# Patient Record
Sex: MALE | Race: WHITE | NOT HISPANIC OR LATINO | Employment: FULL TIME | URBAN - METROPOLITAN AREA
[De-identification: names, ages, dates, MRNs, and addresses within clinical notes are randomized per-mention and may not be internally consistent; named-entity substitution may affect disease eponyms.]

---

## 2017-06-04 ENCOUNTER — MYC MEDICAL ADVICE (OUTPATIENT)
Dept: FAMILY MEDICINE | Facility: CLINIC | Age: 57
End: 2017-06-04

## 2017-06-05 ENCOUNTER — TRANSFERRED RECORDS (OUTPATIENT)
Dept: HEALTH INFORMATION MANAGEMENT | Facility: CLINIC | Age: 57
End: 2017-06-05

## 2017-06-05 ENCOUNTER — ALLIED HEALTH/NURSE VISIT (OUTPATIENT)
Dept: FAMILY MEDICINE | Facility: CLINIC | Age: 57
End: 2017-06-05
Payer: COMMERCIAL

## 2017-06-05 ENCOUNTER — OFFICE VISIT (OUTPATIENT)
Dept: FAMILY MEDICINE | Facility: CLINIC | Age: 57
End: 2017-06-05
Payer: COMMERCIAL

## 2017-06-05 VITALS
WEIGHT: 264.6 LBS | BODY MASS INDEX: 37.04 KG/M2 | TEMPERATURE: 98.7 F | SYSTOLIC BLOOD PRESSURE: 166 MMHG | RESPIRATION RATE: 16 BRPM | HEART RATE: 64 BPM | HEIGHT: 71 IN | DIASTOLIC BLOOD PRESSURE: 102 MMHG

## 2017-06-05 VITALS — DIASTOLIC BLOOD PRESSURE: 102 MMHG | HEART RATE: 64 BPM | SYSTOLIC BLOOD PRESSURE: 166 MMHG

## 2017-06-05 DIAGNOSIS — E78.5 HYPERLIPIDEMIA LDL GOAL <130: ICD-10-CM

## 2017-06-05 DIAGNOSIS — R73.01 IMPAIRED FASTING GLUCOSE: ICD-10-CM

## 2017-06-05 DIAGNOSIS — I10 HYPERTENSION GOAL BP (BLOOD PRESSURE) < 140/90: ICD-10-CM

## 2017-06-05 DIAGNOSIS — G56.03 BILATERAL CARPAL TUNNEL SYNDROME: ICD-10-CM

## 2017-06-05 DIAGNOSIS — I10 HYPERTENSION GOAL BP (BLOOD PRESSURE) < 140/90: Primary | ICD-10-CM

## 2017-06-05 DIAGNOSIS — E78.5 HYPERLIPIDEMIA LDL GOAL <130: Primary | ICD-10-CM

## 2017-06-05 LAB
ALT SERPL W P-5'-P-CCNC: 42 U/L (ref 0–70)
ANION GAP SERPL CALCULATED.3IONS-SCNC: 11 MMOL/L (ref 3–14)
AST SERPL W P-5'-P-CCNC: 24 U/L (ref 0–45)
BUN SERPL-MCNC: 16 MG/DL (ref 7–30)
CALCIUM SERPL-MCNC: 9.3 MG/DL (ref 8.5–10.1)
CHLORIDE SERPL-SCNC: 104 MMOL/L (ref 94–109)
CHOLEST SERPL-MCNC: 289 MG/DL
CO2 SERPL-SCNC: 26 MMOL/L (ref 20–32)
CREAT SERPL-MCNC: 0.92 MG/DL (ref 0.66–1.25)
GFR SERPL CREATININE-BSD FRML MDRD: 85 ML/MIN/1.7M2
GLUCOSE SERPL-MCNC: 120 MG/DL (ref 70–99)
HDLC SERPL-MCNC: 78 MG/DL
LDLC SERPL CALC-MCNC: 173 MG/DL
NONHDLC SERPL-MCNC: 211 MG/DL
POTASSIUM SERPL-SCNC: 4.1 MMOL/L (ref 3.4–5.3)
SODIUM SERPL-SCNC: 141 MMOL/L (ref 133–144)
TRIGL SERPL-MCNC: 188 MG/DL

## 2017-06-05 PROCEDURE — 80061 LIPID PANEL: CPT | Performed by: FAMILY MEDICINE

## 2017-06-05 PROCEDURE — 84460 ALANINE AMINO (ALT) (SGPT): CPT | Performed by: PHYSICIAN ASSISTANT

## 2017-06-05 PROCEDURE — 36415 COLL VENOUS BLD VENIPUNCTURE: CPT | Performed by: FAMILY MEDICINE

## 2017-06-05 PROCEDURE — 84450 TRANSFERASE (AST) (SGOT): CPT | Performed by: PHYSICIAN ASSISTANT

## 2017-06-05 PROCEDURE — 99207 ZZC NO CHARGE NURSE ONLY: CPT

## 2017-06-05 PROCEDURE — 99214 OFFICE O/P EST MOD 30 MIN: CPT | Performed by: PHYSICIAN ASSISTANT

## 2017-06-05 PROCEDURE — 80048 BASIC METABOLIC PNL TOTAL CA: CPT | Performed by: FAMILY MEDICINE

## 2017-06-05 RX ORDER — SIMVASTATIN 20 MG
20 TABLET ORAL AT BEDTIME
Qty: 90 TABLET | Refills: 3 | Status: SHIPPED | OUTPATIENT
Start: 2017-06-05 | End: 2018-06-26

## 2017-06-05 RX ORDER — LISINOPRIL AND HYDROCHLOROTHIAZIDE 12.5; 2 MG/1; MG/1
1 TABLET ORAL DAILY
Qty: 30 TABLET | Refills: 1 | Status: SHIPPED | OUTPATIENT
Start: 2017-06-05 | End: 2017-07-28

## 2017-06-05 ASSESSMENT — PAIN SCALES - GENERAL: PAINLEVEL: NO PAIN (0)

## 2017-06-05 NOTE — NURSING NOTE
"Chief Complaint   Patient presents with     Hypertension       Initial BP (!) 166/102  Pulse 64  Temp 98.7  F (37.1  C) (Temporal)  Ht 5' 10.87\" (1.8 m)  Wt 264 lb 9.6 oz (120 kg)  BMI 37.04 kg/m2 Estimated body mass index is 37.04 kg/(m^2) as calculated from the following:    Height as of this encounter: 5' 10.87\" (1.8 m).    Weight as of this encounter: 264 lb 9.6 oz (120 kg).  Medication Reconciliation: complete     Neeta Rodriguez CMA  "

## 2017-06-05 NOTE — MR AVS SNAPSHOT
After Visit Summary   6/5/2017    Henrry Cortes    MRN: [id number]           Patient Information     Date Of Birth          1960        Visit Information        Provider Department      6/5/2017 10:40 AM Suzie Dodson PA-C [facility]        Today's Diagnoses     Hypertension goal BP (blood pressure) < 140/90    -  1    Hyperlipidemia LDL goal <130        Impaired fasting glucose        Bilateral carpal tunnel syndrome          Care Instructions      Carpal Tunnel Syndrome    Carpal tunnel syndrome is a painful condition of the wrist and arm. It is caused by pressure on the median nerve.  The median nerve is one of the nerves that give feeling and movement to the hand. It passes through a tunnel in the wrist called the carpal tunnel. This tunnel is made up of bones and ligaments. Narrowing of this tunnel or swelling of the tissues inside the tunnel puts pressure on the median nerve. This causes numbness, pins and needles, or electric shooting pains in your hand and forearm. Often the pain is worse at night and may wake you when you are asleep.  Carpal tunnel syndrome may occur during pregnancy and with use of birth control pills. It is more common in workers who must often bend their wrists. It is also common in people who work with power tools that cause strong vibrations.  Home care    Rest the painful wrist. Avoid repeated bending of the wrist back and forth. This puts pressure on the median nerve. Avoid using power tools with strong vibrations.    If you were given a splint, wear it at night while you sleep. You may also wear it during the day for comfort.    Move your fingers and wrists often to avoid stiffness.    Elevate your arms on pillows when you lie down.    Try using the unaffected hand more.    Try not to hold your wrists in a bent, downward position.    Sometimes changes in the work place may ease symptoms. If you type most of the day, it may help to change the  position of your keyboard or add a wrist support. Your wrist should be in a neutral position and not bent back when typing.    You may use over-the-counter pain medicine to treat pain and inflammation, unless another medicine was prescribed. Anti-inflammatory pain medicines, such as ibuprofen or naproxen may be more effective than acetaminophen, which treats pain, but not inflammation. If you have chronic liver or kidney disease or ever had a stomach ulcer or GI bleeding, talk with your doctor before using these medicines.    Opioid pain medicine will only give temporary relief and does not treat the problem. If pain continues, you may need a shot of a steroid drug into your wrist.    If the above methods fail, you may need surgery. This will open the carpal tunnel and release the pressure on the trapped nerve.  Follow-up care  Follow up with your healthcare provider, or as advised, if the pain doesn t begin to improve within the next week.  If X-rays were taken, you will be notified of any new findings that may affect your care.  When to seek medical advice  Call your healthcare provider right away if any of these occur:    Pain not improving with the above treatment    Fingers or hand become cold, blue, numb, or tingly    Your whole arm becomes swollen or weak    [id number] [employer]. [address] [place] [address], [place] [address]. All rights reserved. This information is not intended as a substitute for professional medical care. Always follow your healthcare professional's instructions.        Discharge Instructions for High Blood Pressure (Hypertension)  You have been diagnosed with high blood pressure (also called hypertension). This means the force of blood against your artery walls is too strong. It also means your heart is working hard to move blood. High blood pressure usually has no symptoms, but over time, it can damage your heart, blood vessels, eyes, kidneys, and other organs. With help from  your doctor, you can manage your blood pressure and protect your health.  Taking medications    Learn to take your own blood pressure. Keep a record of your results. Ask your doctor which readings mean that you need medical attention.    Take your blood pressure medication exactly as directed. Don t skip doses. Missing doses can cause your blood pressure to get out of control.    Avoid medications that contain heart stimulants, including over-the-counter drugs. Check for warnings about high blood pressure on the label.    Check with your doctor before taking a decongestant. Some decongestants can worsen high blood pressure.  Lifestyle changes    Maintain a healthy weight. Get help to lose any extra pounds.    Cut back on salt.    Limit canned, dried, packaged, and fast foods.    Don t add salt to your food at the table.    Season foods with herbs instead of salt when you cook.    Follow the DASH (Dietary Approaches to Stop Hypertension) eating plan. This plan recommends vegetables, fruits, whole gains, and other heart healthy foods.    Begin an exercise program. Ask your doctor how to get started. The American Heart Association recommends aerobic exercise 3 to 4 times a week for an average of 40 minutes at a time, with your doctor's approval. Simple activities like walking or gardening can help.    Break the smoking habit. Enroll in a stop-smoking program to improve your chances of success. Ask your health care provider about programs and medications to help you stop smoking.    Limit drinks that contain caffeine (coffee, black or green tea, cola) to 2 per day.    Never take stimulants such as amphetamines or cocaine; these drugs can be deadly for someone with high blood pressure.    Control your stress. Learn stress-management techniques.    Limit alcohol to no more than 1 drink a day for women and 2 drinks a day for men.  Follow-up care  Make a follow-up appointment as directed by our staff.     When to seek  medical care  Call your doctor immediately if you have any of the following:    Chest pain or shortness of breath (call 911)    Moderate to severe headache    Weakness in the muscles of your face, arms, or legs    Trouble speaking    Extreme drowsiness    Confusion    Fainting or dizziness    Pulsating or rushing sound in your ears    Unexplained nosebleed    Weakness, tingling, or numbness of your face, arms, or legs    Change in vision    Blood pressure measured at home that is greater than 180/110     8801-0203 [employer]. [address] [place] [address]. All rights reserved. This information is not intended as a substitute for professional medical care. Always follow your healthcare professional's instructions.                Follow-ups after your visit        Your next 10 appointments already scheduled     Jun 05, 2017 10:40 AM CDT   (Arrive by 10:20 AM)   Office Visit with Suzie Dodson PA-C   [facility] ([facility])    [address] [place] [address], [address]  [facility] [id number] [phone]           Bring a current list of meds and any records pertaining to this visit.  For Physicals, please bring immunization records and any forms needing to be filled out.  Please arrive 10 minutes early to complete paperwork.            Jun 09, 2017  4:20 PM CDT   MyChart Physical Adult with Annie Wall MD   [facility] [place] ([facility])    [address] [place] [address], [address]  [facility] [id number] [phone]              Who to contact     If you have questions or need follow up information about today's clinic visit or your schedule please contact [facility] directly at [phone].  Normal or non-critical lab and imaging results will be communicated to you by MyChart, letter or phone within 4 business days after the clinic has received the results. If you do not hear from us within 7 days, please contact the clinic through  "[employer] or phone. If you have a critical or abnormal lab result, we will notify you by phone as soon as possible.  Submit refill requests through [employer] or call your pharmacy and they will forward the refill request to us. Please allow 3 business days for your refill to be completed.          Additional Information About Your Visit        [employer] Information     [employer] gives you secure access to your electronic health record. If you see a primary care provider, you can also send messages to your care team and make appointments. If you have questions, please call your primary care clinic.  If you do not have a primary care provider, please call [phone] and they will assist you.        Care EveryWhere ID     This is your Care EveryWhere ID. This could be used by other organizations to access your [facility] medical records  [id number]        Your Vitals Were     Pulse Temperature Respirations Height BMI (Body Mass Index)       64 98.7  F (37.1  C) (Temporal) 16 5' 10.87\" (1.8 m) 37.04 kg/m2        Blood Pressure from Last 3 Encounters:   06/05/17 (!) 166/102   06/05/17 (!) 166/102   06/01/16 106/72    Weight from Last 3 Encounters:   06/05/17 264 lb 9.6 oz (120 kg)   06/01/16 234 lb 9.6 oz (106.4 kg)   05/10/16 230 lb 12.8 oz (104.7 kg)              We Performed the Following     ALT     AST     Hemoglobin A1c          Today's Medication Changes          These changes are accurate as of: 6/5/17  9:27 AM.  If you have any questions, ask your nurse or doctor.               Start taking these medicines.        Dose/Directions    lisinopril-hydrochlorothiazide 20-12.5 MG per tablet   Commonly known as:  PRINZIDE/ZESTORETIC   Used for:  Hypertension goal BP (blood pressure) < 140/90   Started by:  Suzie Dodson PA-C        Dose:  1 tablet   Take 1 tablet by mouth daily   Quantity:  30 tablet   Refills:  1       simvastatin 20 MG tablet   Commonly known as:  ZOCOR   Used for:  Hyperlipidemia LDL goal <130 "   Started by:  Suzie Dodson PA-C        Dose:  20 mg   Take 1 tablet (20 mg) by mouth At Bedtime   Quantity:  90 tablet   Refills:  3            Where to get your medicines      These medications were sent to [id number] - [place] - [address]  [address], [place] [address]    Hours:  test fax sent successfully 1/20/04   Phone:  [phone]     lisinopril-hydrochlorothiazide 20-12.5 MG per tablet    simvastatin 20 MG tablet                Primary Care Provider Office Phone # Fax #    Annie Wall [phone]       [facility] [address] [facility]  [place] [address]        Thank you!     Thank you for choosing [facility]  for your care. Our goal is always to provide you with excellent care. Hearing back from our patients is one way we can continue to improve our services. Please take a few minutes to complete the written survey that you may receive in the mail after your visit with us. Thank you!             Your Updated Medication List - Protect others around you: Learn how to safely use, store and throw away your medicines at www.disposemymeds.org.          This list is accurate as of: 6/5/17  9:27 AM.  Always use your most recent med list.                   Brand Name Dispense Instructions for use    ADVIL PO      Take 400 mg by mouth as needed       CIALIS 10 MG tablet   Generic drug:  tadalafil     15    ONE TABLET, TAKEN AT LEAST 30 MINUTES BEFORE INTERCOURSE       lisinopril-hydrochlorothiazide 20-12.5 MG per tablet    PRINZIDE/ZESTORETIC    30 tablet    Take 1 tablet by mouth daily       simvastatin 20 MG tablet    ZOCOR    90 tablet    Take 1 tablet (20 mg) by mouth At Bedtime

## 2017-06-05 NOTE — PROGRESS NOTES
Henrry Cortes is a 56 year old male who comes in today for a Blood Pressure check because of ongoing blood pressure monitoring.    *Document pulse and BP  *Use new set of vitals button for multiple readings.  *Use extended vitals for orthostatic    Vitals as recorded, a large cuff was used.    Patient is not taking medication as prescribed  Patient is monitoring Blood Pressure at home.  Average readings if yes are 174/93    Current complaints: none    Disposition: results routed to MD/AP    . Pt was added to Suzie Dodson's schedule for today 6/5/17, for his blood pressure.     Neeta Rodriguez CMA

## 2017-06-05 NOTE — PROGRESS NOTES
SUBJECTIVE:                                                    Henrry Cortes is a 56 year old male who presents to clinic today for the following health issues:    Hyperlipidemia Follow-Up      Rate your low fat/cholesterol diet?: not monitoring fat    Taking statin?  No    Other lipid medications/supplements?:  none     Hypertension Follow-up      Outpatient blood pressures are being checked at home.  Results are 176/103.    Low Salt Diet: low salt       Amount of exercise or physical activity: Up until a couple months ago he was    Problems taking medications regularly: No    Medication side effects: none    Diet: regular (no restrictions)    Was taking Lisinopril and Simvastatin-- Stopped taking them about 1.5 years ago.   No side effects noticed.   Stopped taking them as he lost weight. No sx presently.   Was smoker- quit April 5, 2017 (2 mo ago). Has gained weight since stopping smoking- eating more, snacking.   Was 230 lb at time he quit, now 264 lb today.   Started rechecking BP with weight gain- knew his pressures were high, so came in today.   Denies CP, SOB, headache, vision changes, weakness, trouble speaking swallowing, hearing changes, balance problems, abd pain. Denies present numbness or tingling.     Has for month or more, numbness in finger tips bilaterally. Notices when sleeping or driving. Has to shake hands out to return feeling and then normal feeling returns. Right > Left. Right handed. Not long lasting.   Feels pressure at wrists.   No neck pain.   No weakness with gripping.  No pain in the hands   More gardening and yard work for hobby over past few months- gripping.  Wondering if the weight gain contributing also  .  Problem list and histories reviewed & adjusted, as indicated.  Additional history: as documented    Patient Active Problem List   Diagnosis     HYPERLIPIDEMIA LDL GOAL <130     Impaired fasting glucose     Snoring     Plantar fasciitis     Hypertension goal BP (blood pressure)  < 140/90     DELANEY (obstructive sleep apnea)-Mild (AHI 8)     Family history of bicuspid cardiac valve     Family history of disease of aorta     Past Surgical History:   Procedure Laterality Date     COLONOSCOPY  9/20/2013    Procedure: COLONOSCOPY;  Colonscopy/Screen for colon cancer  Rp- [facility]  PKT sent 8/14/13 sah;  Surgeon: Annie Wall MD;  Location: [facility] OR     HERNIA REPAIR, UMBILICAL  8/6/04    with mess     LASIK  2003,2004    had revisions x 2     REMOVAL OF SPERM DUCT(S)  2000       Social History   Substance Use Topics     Smoking status: Former Smoker     Types: Cigarettes     Quit date: 10/20/2010     Smokeless tobacco: Never Used     Alcohol use 0.5 oz/week     1 Standard drinks or equivalent per week      Comment: occasionally     Family History   Problem Relation Age of Onset     HEART DISEASE Father      composite valve/aotic graft and triple bypass age 69     Hypertension Father      CANCER Father      CEREBROVASCULAR DISEASE Father      Prostate Cancer Father      Cardiovascular Father      Lipids Father      Hypertension Mother      Alzheimer Disease Mother      dementia     DIABETES Paternal Grandmother      Genitourinary Problems Maternal Grandmother      kidney     CANCER Sister 47     breast     Breast Cancer Sister      Breast Cancer Sister      Asthma Son      mild     C.A.D. No family hx of      Cancer - colorectal No family hx of      Alcohol/Drug No family hx of      Allergies No family hx of      Anesthesia Reaction No family hx of      Arthritis No family hx of      Blood Disease No family hx of      Circulatory No family hx of      Congenital Anomalies No family hx of      Connective Tissue Disorder No family hx of      Depression No family hx of      Eye Disorder No family hx of      Genetic Disorder No family hx of      GASTROINTESTINAL DISEASE No family hx of      Gynecology No family hx of      Musculoskeletal Disorder No family hx of      Neurologic Disorder No family hx  "of      Obesity No family hx of      OSTEOPOROSIS No family hx of      Psychotic Disorder No family hx of      Respiratory No family hx of      Thyroid Disease No family hx of      Hearing Loss No family hx of          Current Outpatient Prescriptions   Medication Sig Dispense Refill     lisinopril-hydrochlorothiazide (PRINZIDE/ZESTORETIC) 20-12.5 MG per tablet Take 1 tablet by mouth daily 30 tablet 1     simvastatin (ZOCOR) 20 MG tablet Take 1 tablet (20 mg) by mouth At Bedtime 90 tablet 3     CIALIS 10 MG OR TABS ONE TABLET, TAKEN AT LEAST 30 MINUTES BEFORE INTERCOURSE 15 1 YEAR     Ibuprofen (ADVIL PO) Take 400 mg by mouth as needed       Allergies   Allergen Reactions     No Known Drug Allergies      BP Readings from Last 3 Encounters:   06/05/17 (!) 166/102   06/05/17 (!) 166/102   06/01/16 106/72    Wt Readings from Last 3 Encounters:   06/05/17 264 lb 9.6 oz (120 kg)   06/01/16 234 lb 9.6 oz (106.4 kg)   05/10/16 230 lb 12.8 oz (104.7 kg)                  Labs reviewed in EPIC    Reviewed and updated as needed this visit by clinical staff  Tobacco  Allergies  Med Hx  Surg Hx  Fam Hx  Soc Hx      Reviewed and updated as needed this visit by Provider         ROS:  Constitutional, HEENT, cardiovascular, pulmonary, gi and gu systems are negative, except as otherwise noted.    OBJECTIVE:                                                    BP (!) 166/102  Pulse 64  Temp 98.7  F (37.1  C) (Temporal)  Resp 16  Ht 5' 10.87\" (1.8 m)  Wt 264 lb 9.6 oz (120 kg)  BMI 37.04 kg/m2  Body mass index is 37.04 kg/(m^2).  GENERAL: healthy, alert and no distress  EYES: Eyes grossly normal to inspection, PERRL and conjunctivae and sclerae normal  HENT: ear canals and TM's normal, nose and mouth without ulcers or lesions  NECK: no adenopathy, no asymmetry, masses, or scars, thyroid normal to palpation and no carotid bruits  RESP: lungs clear to auscultation - no rales, rhonchi or wheezes  CV: regular rate and rhythm, " normal S1 S2, no S3 or S4, no murmur, click or rub, no peripheral edema and peripheral pulses strong  ABDOMEN: soft, nontender, no masses and bowel sounds normal  MSK: Hands: no swelling, no skin changes, no atrophy. Normal ROM,  5/5. Sensation intact, normal cap refill. +tinnels and + phalens bilaterally.  No LE edema. + LE varicosities, nontender  NEURO: Normal strength and tone, mentation intact and speech normal  PSYCH: mentation appears normal, affect normal/bright    Diagnostic Test Results:  No results found for this or any previous visit (from the past 24 hour(s)).     ASSESSMENT/PLAN:                                                      1. Hypertension goal BP (blood pressure) < 140/90  Stage 2 HTN, untreated with being off medication and weight gain.   Has had lisinopril in the past and tolerated. Add HCTZ, in combo tab as below.  Pt had labs today (BMP and lipids), and has annual exam scheduled for Friday this week with PCP- follow up at that visit.   Pt will monitor BP at home.   Discussed warning s/sx that would prompt ER visit with elevated BP, pt will restart medication today.   Congratulated on smoking cessation.   - lisinopril-hydrochlorothiazide (PRINZIDE/ZESTORETIC) 20-12.5 MG per tablet; Take 1 tablet by mouth daily  Dispense: 30 tablet; Refill: 1    2. Hyperlipidemia LDL goal <130  Pt states willing to restart, refill sent. Tolerated in the past.   - simvastatin (ZOCOR) 20 MG tablet; Take 1 tablet (20 mg) by mouth At Bedtime  Dispense: 90 tablet; Refill: 3  - ALT  - AST    3. Impaired fasting glucose  Hx of IFG. Wweight gain of 30lb over past year. Check a1c in addition to glucose.   - Hemoglobin A1c; Future    4. Bilateral carpal tunnel syndrome  Discussed s/sx, aggravating factors.   Thumb spica splint given bilaterally. Ice, limit activity that aggravates.  If sx persist, referral to ortho      Follow Up: For worsening symptoms (ie new fevers, worsening pain, etc), non-improvement as  expected/discussed, questions regarding your medications or treatment plan. Discussed parameters for follow up and included in After Visit Summary given to patient.      Suzie Dodson PA-C  [facility]

## 2017-06-05 NOTE — PATIENT INSTRUCTIONS
Carpal Tunnel Syndrome    Carpal tunnel syndrome is a painful condition of the wrist and arm. It is caused by pressure on the median nerve.  The median nerve is one of the nerves that give feeling and movement to the hand. It passes through a tunnel in the wrist called the carpal tunnel. This tunnel is made up of bones and ligaments. Narrowing of this tunnel or swelling of the tissues inside the tunnel puts pressure on the median nerve. This causes numbness, pins and needles, or electric shooting pains in your hand and forearm. Often the pain is worse at night and may wake you when you are asleep.  Carpal tunnel syndrome may occur during pregnancy and with use of birth control pills. It is more common in workers who must often bend their wrists. It is also common in people who work with power tools that cause strong vibrations.  Home care    Rest the painful wrist. Avoid repeated bending of the wrist back and forth. This puts pressure on the median nerve. Avoid using power tools with strong vibrations.    If you were given a splint, wear it at night while you sleep. You may also wear it during the day for comfort.    Move your fingers and wrists often to avoid stiffness.    Elevate your arms on pillows when you lie down.    Try using the unaffected hand more.    Try not to hold your wrists in a bent, downward position.    Sometimes changes in the work place may ease symptoms. If you type most of the day, it may help to change the position of your keyboard or add a wrist support. Your wrist should be in a neutral position and not bent back when typing.    You may use over-the-counter pain medicine to treat pain and inflammation, unless another medicine was prescribed. Anti-inflammatory pain medicines, such as ibuprofen or naproxen may be more effective than acetaminophen, which treats pain, but not inflammation. If you have chronic liver or kidney disease or ever had a stomach ulcer or GI bleeding, talk with your  doctor before using these medicines.    Opioid pain medicine will only give temporary relief and does not treat the problem. If pain continues, you may need a shot of a steroid drug into your wrist.    If the above methods fail, you may need surgery. This will open the carpal tunnel and release the pressure on the trapped nerve.  Follow-up care  Follow up with your healthcare provider, or as advised, if the pain doesn t begin to improve within the next week.  If X-rays were taken, you will be notified of any new findings that may affect your care.  When to seek medical advice  Call your healthcare provider right away if any of these occur:    Pain not improving with the above treatment    Fingers or hand become cold, blue, numb, or tingly    Your whole arm becomes swollen or weak    9859-3765 [employer]. [address] [place] [address], [place] [address]. All rights reserved. This information is not intended as a substitute for professional medical care. Always follow your healthcare professional's instructions.        Discharge Instructions for High Blood Pressure (Hypertension)  You have been diagnosed with high blood pressure (also called hypertension). This means the force of blood against your artery walls is too strong. It also means your heart is working hard to move blood. High blood pressure usually has no symptoms, but over time, it can damage your heart, blood vessels, eyes, kidneys, and other organs. With help from your doctor, you can manage your blood pressure and protect your health.  Taking medications    Learn to take your own blood pressure. Keep a record of your results. Ask your doctor which readings mean that you need medical attention.    Take your blood pressure medication exactly as directed. Don t skip doses. Missing doses can cause your blood pressure to get out of control.    Avoid medications that contain heart stimulants, including over-the-counter drugs. Check for warnings about  high blood pressure on the label.    Check with your doctor before taking a decongestant. Some decongestants can worsen high blood pressure.  Lifestyle changes    Maintain a healthy weight. Get help to lose any extra pounds.    Cut back on salt.    Limit canned, dried, packaged, and fast foods.    Don t add salt to your food at the table.    Season foods with herbs instead of salt when you cook.    Follow the DASH (Dietary Approaches to Stop Hypertension) eating plan. This plan recommends vegetables, fruits, whole gains, and other heart healthy foods.    Begin an exercise program. Ask your doctor how to get started. The American Heart Association recommends aerobic exercise 3 to 4 times a week for an average of 40 minutes at a time, with your doctor's approval. Simple activities like walking or gardening can help.    Break the smoking habit. Enroll in a stop-smoking program to improve your chances of success. Ask your health care provider about programs and medications to help you stop smoking.    Limit drinks that contain caffeine (coffee, black or green tea, cola) to 2 per day.    Never take stimulants such as amphetamines or cocaine; these drugs can be deadly for someone with high blood pressure.    Control your stress. Learn stress-management techniques.    Limit alcohol to no more than 1 drink a day for women and 2 drinks a day for men.  Follow-up care  Make a follow-up appointment as directed by our staff.     When to seek medical care  Call your doctor immediately if you have any of the following:    Chest pain or shortness of breath (call 911)    Moderate to severe headache    Weakness in the muscles of your face, arms, or legs    Trouble speaking    Extreme drowsiness    Confusion    Fainting or dizziness    Pulsating or rushing sound in your ears    Unexplained nosebleed    Weakness, tingling, or numbness of your face, arms, or legs    Change in vision    Blood pressure measured at home that is greater  than 180/110     4086-2239 [employer]. [address] [place] [address], [place] [address]. All rights reserved. This information is not intended as a substitute for professional medical care. Always follow your healthcare professional's instructions.

## 2017-06-05 NOTE — MR AVS SNAPSHOT
After Visit Summary   6/5/2017    Henrry Cortes    MRN: [id number]           Patient Information     Date Of Birth          1960        Visit Information        Provider Department      6/5/2017 9:00 AM RG [facility] [place] [facility]hav[facility]        Today's Diagnoses     Hyperlipidemia LDL goal <130    -  1    Hypertension goal BP (blood pressure) < 140/90           Follow-ups after your visit        Follow-up notes from your care team     Return for BP Recheck.      Your next 10 appointments already scheduled     Jun 05, 2017  9:00 AM CDT   Nurse Only with RG FLOAT 1   [facility] [facility]    [address]  [facility] [id number] [phone]            Jun 05, 2017 10:30 AM CDT   LAB with RG LAB   [facility] [place] [facility]    [address]  [facility] [id number] [phone]           Patient must bring picture ID.  Patient should be prepared to give a urine specimen  Please do not eat 10-12 hours before your appointment if you are coming in fasting for labs on lipids, cholesterol, or glucose (sugar).  Pregnant women should follow their Care Team instructions. Water with medications is okay. Do not drink coffee or other fluids.   If you have concerns about taking  your medications, please ask at office or if scheduling via [employer], send a message by clicking on Secure Messaging, Message Your Care Team.            Jun 09, 2017  4:20 PM CDT   MyChart Physical Adult with Annie Wall MD   [facility] [facility]    [address]  [facility] [id number] [phone]              Who to contact     If you have questions or need follow up information about today's clinic visit or your schedule please [facility] directly at [phone].  Normal or non-critical lab and imaging results will be communicated to you by MyChart, letter or phone  within 4 business days after the clinic has received the results. If you do not hear from us within 7 days, please contact the clinic through [employer] or phone. If you have a critical or abnormal lab result, we will notify you by phone as soon as possible.  Submit refill requests through [employer] or call your pharmacy and they will forward the refill request to us. Please allow 3 business days for your refill to be completed.          Additional Information About Your Visit        [email or web address] Information     [employer] gives you secure access to your electronic health record. If you see a primary care provider, you can also send messages to your care team and make appointments. If you have questions, please call your primary care clinic.  If you do not have a primary care provider, please call [phone] and they will assist you.        Care EveryWhere ID     This is your Care EveryWhere ID. This could be used by other organizations to access your [facility] [place] medical records  [id number]        Your Vitals Were     Pulse                   64            Blood Pressure from Last 3 Encounters:   06/05/17 (!) 166/102   06/01/16 106/72   05/10/16 122/70    Weight from Last 3 Encounters:   06/01/16 234 lb 9.6 oz (106.4 kg)   05/10/16 230 lb 12.8 oz (104.7 kg)   03/03/16 227 lb 1.6 oz (103 kg)              We Performed the Following     Basic metabolic panel  (Ca, Cl, CO2, Creat, Gluc, K, Na, BUN)     Lipid Profile (Chol, Trig, HDL, LDL calc)        Primary Care Provider Office Phone # Fax #    Annie Wall [phone]       [facility] [address] [facility] [address]        Thank you!     Thank you for choosing [facility]  for your care. Our goal is always to provide you with excellent care. Hearing back from our patients is one way we can continue to improve our services. Please take a few minutes to complete the written survey that you may receive in the mail after your visit with  us. Thank you!             Your Updated Medication List - Protect others around you: Learn how to safely use, store and throw away your medicines at www.disposemymeds.org.          This list is accurate as of: 6/5/17  8:53 AM.  Always use your most recent med list.                   Brand Name Dispense Instructions for use    ADVIL PO      Take 400 mg by mouth as needed       CIALIS 10 MG tablet   Generic drug:  tadalafil     15    ONE TABLET, TAKEN AT LEAST 30 MINUTES BEFORE INTERCOURSE

## 2017-06-06 NOTE — PATIENT INSTRUCTIONS
Recheck fasting labs in 4-6 weeks.     Preventive Health Recommendations  Male Ages 50   64    Yearly exam:             See your health care provider every year in order to  o   Review health changes.   o   Discuss preventive care.    o   Review your medicines if your doctor has prescribed any.     Have a cholesterol test every 5 years, or more frequently if you are at risk for high cholesterol/heart disease.     Have a diabetes test (fasting glucose) every three years. If you are at risk for diabetes, you should have this test more often.     Have a colonoscopy at age 50, or have a yearly FIT test (stool test). These exams will check for colon cancer.      Talk with your health care provider about whether or not a prostate cancer screening test (PSA) is right for you.    You should be tested each year for STDs (sexually transmitted diseases), if you re at risk.     Shots: Get a flu shot each year. Get a tetanus shot every 10 years.     Nutrition:    Eat at least 5 servings of fruits and vegetables daily.     Eat whole-grain bread, whole-wheat pasta and brown rice instead of white grains and rice.     Talk to your provider about Calcium and Vitamin D.     Lifestyle    Exercise for at least 150 minutes a week (30 minutes a day, 5 days a week). This will help you control your weight and prevent disease.     Limit alcohol to one drink per day.     No smoking.     Wear sunscreen to prevent skin cancer.     See your dentist every six months for an exam and cleaning.     See your eye doctor every 1 to 2 years.

## 2017-06-06 NOTE — PROGRESS NOTES
"  SUBJECTIVE:     CC: Henrry Cortes is an 56 year old male who presents for preventative health visit.     Healthy Habits:    Do you get at least three servings of calcium containing foods daily (dairy, green leafy vegetables, etc.)? {YES/NO, DAIRY INTAKE:721542::\"yes\"}    Amount of exercise or daily activities, outside of work: {AMOUNT EXERCISE:539484}    Problems taking medications regularly {Yes /No default:626555::\"No\"}    Medication side effects: {Yes /No default.:833589::\"No\"}    Have you had an eye exam in the past two years? {YESNOBLANK:579436}    Do you see a dentist twice per year? {YESNOBLANK:072600}    Do you have sleep apnea, excessive snoring or daytime drowsiness?{YESNOBLANK:598605}    {Outside tests to abstract? :370584}    {additional problems to add:041638}    Today's PHQ-2 Score:   PHQ-2 ( 1999 Pfizer) 6/5/2017 6/4/2017   Q1: Little interest or pleasure in doing things 0 0   Q2: Feeling down, depressed or hopeless 0 0   PHQ-2 Score 0 0   Q1: Little interest or pleasure in doing things - Not at all   Q2: Feeling down, depressed or hopeless - Not at all   PHQ-2 Score - 0     {PHQ-2 LOOK IN ASSESSMENTS :431750}  Abuse: Current or Past(Physical, Sexual or Emotional)- {YES/NO/NA:164778}  Do you feel safe in your environment - {YES/NO/NA:757862}    Social History   Substance Use Topics     Smoking status: Former Smoker     Types: Cigarettes     Quit date: 10/20/2010     Smokeless tobacco: Never Used     Alcohol use 0.5 oz/week     1 Standard drinks or equivalent per week      Comment: occasionally     {ETOH AUDIT:552943}    Last PSA:   PSA   Date Value Ref Range Status   03/03/2016 0.35 0 - 4 ug/L Final       Recent Labs   Lab Test  06/05/17   0842  05/10/16   0914   07/18/14   1034  07/11/13   0804   CHOL  289*  180   --   185  198   HDL  78  66   --   62  53   LDL  173*  99   < >  106  115   TRIG  188*  75   --   87  149   CHOLHDLRATIO   --    --    --   3.0  3.7   [facility]  211*  114   --    --    --     " "< > = values in this interval not displayed.       Reviewed orders with patient. Reviewed health maintenance and updated orders accordingly - {Yes/No:235091::\"Yes\"}    Reviewed and updated as needed this visit by clinical staff         Reviewed and updated as needed this visit by Provider        {HISTORY OPTIONS:017957}    ROS:  {MALE ROS-adult preventive care package:161262::\"C: NEGATIVE for fever, chills, change in weight\",\"I: NEGATIVE for worrisome rashes, moles or lesions\",\"E: NEGATIVE for vision changes or irritation\",\"ENT: NEGATIVE for ear, mouth and throat problems\",\"R: NEGATIVE for significant cough or SOB\",\"CV: NEGATIVE for chest pain, palpitations or peripheral edema\",\"GI: NEGATIVE for nausea, abdominal pain, heartburn, or change in bowel habits\",\" male: negative for dysuria, hematuria, decreased urinary stream, erectile dysfunction, urethral discharge\",\"M: NEGATIVE for significant arthralgias or myalgia\",\"N: NEGATIVE for weakness, dizziness or paresthesias\",\"P: NEGATIVE for changes in mood or affect\"}    {CHRONICPROBDATA:334953}  OBJECTIVE:     There were no vitals taken for this visit.  EXAM:  {Exam Choices:364335}    ASSESSMENT/PLAN:     {Diag Picklist:580940}    COUNSELING:  {MALE COUNSELING MESSAGES:298928::\"Reviewed preventive health counseling, as reflected in patient instructions\"}    {Blood Pressure - Adult Preventive:863685}     reports that he quit smoking about 6 years ago. His smoking use included Cigarettes. He has never used smokeless tobacco.  {Tobacco Cessation needed for ACO -- Delete if patient is a non-smoker:667697}  Estimated body mass index is 37.04 kg/(m^2) as calculated from the following:    Height as of 6/5/17: 5' 10.87\" (1.8 m).    Weight as of 6/5/17: 264 lb 9.6 oz (120 kg).   {Weight Management Plan needed for ACO:566972}    Counseling Resources:  ATP IV Guidelines  Pooled Cohorts Equation Calculator  FRAX Risk Assessment  ICSI Preventive Guidelines  Dietary Guidelines for " Americans, 2010  USDA's MyPlate  ASA Prophylaxis  Lung CA Screening    TRENT PERES MD, MD  [facility] [place]

## 2017-06-09 ENCOUNTER — OFFICE VISIT (OUTPATIENT)
Dept: FAMILY MEDICINE | Facility: CLINIC | Age: 57
End: 2017-06-09
Payer: COMMERCIAL

## 2017-06-09 VITALS
TEMPERATURE: 98.7 F | BODY MASS INDEX: 35.84 KG/M2 | RESPIRATION RATE: 16 BRPM | WEIGHT: 256 LBS | HEART RATE: 60 BPM | HEIGHT: 71 IN | DIASTOLIC BLOOD PRESSURE: 76 MMHG | SYSTOLIC BLOOD PRESSURE: 118 MMHG

## 2017-06-09 DIAGNOSIS — I10 HYPERTENSION GOAL BP (BLOOD PRESSURE) < 140/90: ICD-10-CM

## 2017-06-09 DIAGNOSIS — Z00.01 ENCOUNTER FOR ROUTINE ADULT MEDICAL EXAM WITH ABNORMAL FINDINGS: Primary | ICD-10-CM

## 2017-06-09 DIAGNOSIS — E66.01 MORBID OBESITY DUE TO EXCESS CALORIES (H): ICD-10-CM

## 2017-06-09 DIAGNOSIS — E78.5 HYPERLIPIDEMIA LDL GOAL <130: ICD-10-CM

## 2017-06-09 DIAGNOSIS — K42.9 UMBILICAL HERNIA WITHOUT OBSTRUCTION AND WITHOUT GANGRENE: ICD-10-CM

## 2017-06-09 DIAGNOSIS — R73.01 IMPAIRED FASTING GLUCOSE: ICD-10-CM

## 2017-06-09 PROCEDURE — 99396 PREV VISIT EST AGE 40-64: CPT | Performed by: FAMILY MEDICINE

## 2017-06-09 ASSESSMENT — PAIN SCALES - GENERAL: PAINLEVEL: NO PAIN (0)

## 2017-06-09 NOTE — PROGRESS NOTES
SUBJECTIVE:     CC: Henrry Cortes is an 56 year old male who presents for preventative health visit.     Physical   Annual:     Getting at least 3 servings of Calcium per day::  Yes    Bi-annual eye exam::  Yes    Dental care twice a year::  Yes    Sleep apnea or symptoms of sleep apnea::  Daytime drowsiness, Excessive snoring and Sleep apnea    Diet::  Regular (no restrictions)    Frequency of exercise::  2-3 days/week    Duration of exercise::  45-60 minutes    Taking medications regularly::  Yes    Additional concerns today::  YES (1) discuss about blood pressure 2) hips 3) possible hernia  4)knot in the calf muscle)    SMOKING CESSATION- has quit again. Gained weight. Has been starting to exercise    HYPERTENSION- restarted medication. No concerns with this. Feels well.     HIP- had one year check for hip surgery. Left hip hasn't deteriorated much in the last year. Had right hip done n the past. That is doing well.     POSSIBLE HERNIA- patient reports that he has noted a possible hernia. At umbilicus. History of repair in the past.         Today's PHQ-2 Score:   PHQ-2 ( 1999 Pfizer) 6/4/2017   Q1: Little interest or pleasure in doing things Not at all   Q2: Feeling down, depressed or hopeless Not at all   PHQ-2 Score 0       Abuse: Current or Past(Physical, Sexual or Emotional)- NO  Do you feel safe in your environment - Yes    Social History   Substance Use Topics     Smoking status: Former Smoker     Packs/day: 0.50     Types: Cigarettes     Quit date: 10/20/2010     Smokeless tobacco: Never Used     Alcohol use 0.5 oz/week      Comment: occasionally     The patient does not drink >3 drinks per day nor >7 drinks per week.    Last PSA:   PSA   Date Value Ref Range Status   03/03/2016 0.35 0 - 4 ug/L Final       Recent Labs   Lab Test  06/05/17   0842  05/10/16   0914   07/18/14   1034  07/11/13   0804   CHOL  289*  180   --   185  198   HDL  78  66   --   62  53   LDL  173*  99   < >  106  115   TRIG  188*  75   " --   87  149   CHOLHDLRATIO   --    --    --   3.0  3.7   NHDL  211*  114   --    --    --     < > = values in this interval not displayed.       Reviewed orders with patient. Reviewed health maintenance and updated orders accordingly - Yes    Reviewed and updated as needed this visit by clinical staff  Tobacco  Allergies  Med Hx  Surg Hx  Fam Hx  Soc Hx        Reviewed and updated as needed this visit by Provider            ROS:  C: NEGATIVE for fever, chills, change in weight  I: NEGATIVE for worrisome rashes, moles or lesions  E: NEGATIVE for vision changes or irritation  ENT: NEGATIVE for ear, mouth and throat problems  R: NEGATIVE for significant cough or SOB  CV: NEGATIVE for chest pain, palpitations or peripheral edema  GI: NEGATIVE for nausea, abdominal pain, heartburn, or change in bowel habits   male: negative for dysuria, hematuria, decreased urinary stream, erectile dysfunction, urethral discharge  M: NEGATIVE for significant arthralgias or myalgia  N: NEGATIVE for weakness, dizziness or paresthesias  P: NEGATIVE for changes in mood or affect    Problem list, Medication list, Allergies, and Medical/Social/Surgical histories reviewed in [facility] and updated as appropriate.  OBJECTIVE:     /76  Pulse 60  Temp 98.7  F (37.1  C) (Temporal)  Resp 16  Ht 5' 11\" (1.803 m)  Wt 256 lb (116.1 kg)  BMI 35.7 kg/m2  EXAM:  GENERAL: healthy, alert and no distress  EYES: Eyes grossly normal to inspection, PERRL and conjunctivae and sclerae normal  HENT: ear canals and TM's normal, nose and mouth without ulcers or lesions  NECK: no adenopathy, no asymmetry, masses, or scars and thyroid normal to palpation  RESP: lungs clear to auscultation - no rales, rhonchi or wheezes  CV: regular rate and rhythm, normal S1 S2, no S3 or S4, no murmur, click or rub, no peripheral edema and peripheral pulses strong  ABDOMEN: soft, nontender, no hepatosplenomegaly, no masses and bowel sounds normal. Small umbilcal defect " "noted.    (male): normal male genitalia without lesions or urethral discharge, no hernia  RECTAL: normal sphincter tone, no rectal masses, prostate normal size, smooth, nontender without nodules or masses  MS: no gross musculoskeletal defects noted, no edema  SKIN: no suspicious lesions or rashes  NEURO: Normal strength and tone, mentation intact and speech normal  PSYCH: mentation appears normal, affect normal/bright    ASSESSMENT/PLAN:     1. Encounter for routine adult medical exam with abnormal findings  See counseling messages     2. Morbid obesity due to excess calories (H)  Discussed exercise and dietary changes.   All questions invited, asked and answered to the patient's apparent satisfaction.      3. Hypertension goal BP (blood pressure) < 140/90  Doing well. Well controlled. Tolerating medication.  No change in plan.       4. Impaired fasting glucose  See above.   Recheck labs in 6-8 weeks.     5. Hyperlipidemia LDL goal <130  Continue with statin.   Recheck labs in 6-8 weeks.     6. Umbilical hernia without obstruction and without gangrene  Small. Watch for any concern with pain in the region, redness or swelling.       COUNSELING:   Reviewed preventive health counseling, as reflected in patient instructions  Special attention given to:        Regular exercise       Healthy diet/nutrition       Colon cancer screening       Prostate cancer screening         reports that he quit smoking about 6 years ago. His smoking use included Cigarettes. He smoked 0.50 packs per day. He has never used smokeless tobacco.    Estimated body mass index is 35.7 kg/(m^2) as calculated from the following:    Height as of this encounter: 5' 11\" (1.803 m).    Weight as of this encounter: 256 lb (116.1 kg).   Weight management plan: Discussed healthy diet and exercise guidelines and patient will follow up in 6 months in clinic to re-evaluate.    Counseling Resources:  ATP IV Guidelines  Pooled Cohorts Equation Calculator  FRAX " Risk Assessment  ICSI Preventive Guidelines  Dietary Guidelines for Americans, 2010  USDA's MyPlate  ASA Prophylaxis  Lung CA Screening    TRENT PERES MD, MD  [facility]

## 2017-06-09 NOTE — MR AVS SNAPSHOT
After Visit Summary   6/9/2017    Henrry Cortes    MRN: [id number]           Patient Information     Date Of Birth          1960        Visit Information        Provider Department      6/9/2017 4:20 PM Annie Wall MD [facility]        Today's Diagnoses     Encounter for routine adult medical exam with abnormal findings          Care Instructions      Recheck fasting labs in 4-6 weeks.     Preventive Health Recommendations  Male Ages 50 - 64    Yearly exam:             See your health care provider every year in order to  o   Review health changes.   o   Discuss preventive care.    o   Review your medicines if your doctor has prescribed any.     Have a cholesterol test every 5 years, or more frequently if you are at risk for high cholesterol/heart disease.     Have a diabetes test (fasting glucose) every three years. If you are at risk for diabetes, you should have this test more often.     Have a colonoscopy at age 50, or have a yearly FIT test (stool test). These exams will check for colon cancer.      Talk with your health care provider about whether or not a prostate cancer screening test (PSA) is right for you.    You should be tested each year for STDs (sexually transmitted diseases), if you re at risk.     Shots: Get a flu shot each year. Get a tetanus shot every 10 years.     Nutrition:    Eat at least 5 servings of fruits and vegetables daily.     Eat whole-grain bread, whole-wheat pasta and brown rice instead of white grains and rice.     Talk to your provider about Calcium and Vitamin D.     Lifestyle    Exercise for at least 150 minutes a week (30 minutes a day, 5 days a week). This will help you control your weight and prevent disease.     Limit alcohol to one drink per day.     No smoking.     Wear sunscreen to prevent skin cancer.     See your dentist every six months for an exam and cleaning.     See your eye doctor every 1 to 2 years.            Follow-ups  "after your visit        Who to contact     If you have questions or need follow up information about today's clinic visit or your schedule please contact [facility] directly at [phone].  Normal or non-critical lab and imaging results will be communicated to you by MyChart, letter or phone within 4 business days after the clinic has received the results. If you do not hear from us within 7 days, please contact the clinic through MyChart or phone. If you have a critical or abnormal lab result, we will notify you by phone as soon as possible.  Submit refill requests through [employer] or call your pharmacy and they will forward the refill request to us. Please allow 3 business days for your refill to be completed.          Additional Information About Your Visit        [employer] Information     [employer] gives you secure access to your electronic health record. If you see a primary care provider, you can also send messages to your care team and make appointments. If you have questions, please call your primary care clinic.  If you do not have a primary care provider, please call [phone] and they will assist you.        Care EveryWhere ID     This is your Care EveryWhere ID. This could be used by other organizations to access your [facility] medical records  [id number]        Your Vitals Were     Pulse Temperature Respirations Height BMI (Body Mass Index)       60 98.7  F (37.1  C) (Temporal) 16 5' 11\" (1.803 m) 35.7 kg/m2        Blood Pressure from Last 3 Encounters:   06/09/17 118/76   06/05/17 (!) 166/102   06/05/17 (!) 166/102    Weight from Last 3 Encounters:   06/09/17 256 lb (116.1 kg)   06/05/17 264 lb 9.6 oz (120 kg)   06/01/16 234 lb 9.6 oz (106.4 kg)              Today, you had the following     No orders found for display       Primary Care Provider Office Phone # Fax #    Annie Wall [phone]       [facility] [address]  [place] [address]        Thank " you!     Thank you for choosing [facility]  for your care. Our goal is always to provide you with excellent care. Hearing back from our patients is one way we can continue to improve our services. Please take a few minutes to complete the written survey that you may receive in the mail after your visit with us. Thank you!             Your Updated Medication List - Protect others around you: Learn how to safely use, store and throw away your medicines at www.disposemymeds.org.          This list is accurate as of: 6/9/17  5:15 PM.  Always use your most recent med list.                   Brand Name Dispense Instructions for use    ADVIL PO      Take 400 mg by mouth as needed       CIALIS 10 MG tablet   Generic drug:  tadalafil     15    ONE TABLET, TAKEN AT LEAST 30 MINUTES BEFORE INTERCOURSE       lisinopril-hydrochlorothiazide 20-12.5 MG per tablet    PRINZIDE/ZESTORETIC    30 tablet    Take 1 tablet by mouth daily       simvastatin 20 MG tablet    ZOCOR    90 tablet    Take 1 tablet (20 mg) by mouth At Bedtime

## 2017-06-09 NOTE — NURSING NOTE
"Chief Complaint   Patient presents with     Physical     Panel Management     UTD       Initial /76  Pulse 60  Temp 98.7  F (37.1  C) (Temporal)  Resp 16  Ht 5' 11\" (1.803 m)  Wt 256 lb (116.1 kg)  BMI 35.7 kg/m2 Estimated body mass index is 35.7 kg/(m^2) as calculated from the following:    Height as of this encounter: 5' 11\" (1.803 m).    Weight as of this encounter: 256 lb (116.1 kg).  Medication Reconciliation: complete       Armando Oh MA    "

## 2017-06-26 DIAGNOSIS — I10 HYPERTENSION GOAL BP (BLOOD PRESSURE) < 140/90: ICD-10-CM

## 2017-06-26 RX ORDER — LISINOPRIL AND HYDROCHLOROTHIAZIDE 12.5; 2 MG/1; MG/1
1 TABLET ORAL DAILY
Qty: 30 TABLET | Refills: 1 | Status: CANCELLED | OUTPATIENT
Start: 2017-06-26

## 2017-06-26 NOTE — TELEPHONE ENCOUNTER
LISINOPRIL      Last Written Prescription Date: 6-5-17  Last Fill Quantity: 30, # refills: 1  Last Office Visit with G, P or [facility] prescribing provider: 6-9-17       Potassium   Date Value Ref Range Status   06/05/2017 4.1 3.4 - 5.3 mmol/L Final     Creatinine   Date Value Ref Range Status   06/05/2017 0.92 0.66 - 1.25 mg/dL Final     BP Readings from Last 3 Encounters:   06/09/17 118/76   06/05/17 (!) 166/102   06/05/17 (!) 166/102

## 2017-07-28 DIAGNOSIS — I10 HYPERTENSION GOAL BP (BLOOD PRESSURE) < 140/90: ICD-10-CM

## 2017-07-28 RX ORDER — LISINOPRIL AND HYDROCHLOROTHIAZIDE 12.5; 2 MG/1; MG/1
1 TABLET ORAL DAILY
Qty: 30 TABLET | Refills: 4 | Status: SHIPPED | OUTPATIENT
Start: 2017-07-28 | End: 2017-07-28

## 2017-07-28 NOTE — TELEPHONE ENCOUNTER
lisinopril-hydrochlorothiazide (PRINZIDE/ZESTORETIC) 20-12.5 MG per tablet  Prescription approved per [facility] Refill Protocol.    Per LOV to follow up in 6 months.     Mayra Ramirez RN, BSN

## 2017-07-28 NOTE — TELEPHONE ENCOUNTER
LISINOPRIL      Last Written Prescription Date: 6-5-17  Last Fill Quantity: 30, # refills: 1  Last Office Visit with G, P or [facility] prescribing provider: 6-9-17       Potassium   Date Value Ref Range Status   06/05/2017 4.1 3.4 - 5.3 mmol/L Final     Creatinine   Date Value Ref Range Status   06/05/2017 0.92 0.66 - 1.25 mg/dL Final     BP Readings from Last 3 Encounters:   06/09/17 118/76   06/05/17 (!) 166/102   06/05/17 (!) 166/102

## 2017-07-31 RX ORDER — LISINOPRIL AND HYDROCHLOROTHIAZIDE 12.5; 2 MG/1; MG/1
TABLET ORAL
Qty: 90 TABLET | Refills: 0 | Status: SHIPPED | OUTPATIENT
Start: 2017-07-31 | End: 2019-02-14

## 2017-09-26 ENCOUNTER — TELEPHONE (OUTPATIENT)
Dept: FAMILY MEDICINE | Facility: CLINIC | Age: 57
End: 2017-09-26

## 2017-09-26 NOTE — TELEPHONE ENCOUNTER
Panel Management Review      Patient has the following on his problem list: hypertension and hyperlipemia     Composite cancer screening  Chart review shows that this patient is due/due soon for the following None  Summary:    Patient is due/failing the following:   LDL    Action needed:   Patient needs fasting lab only appointment    Type of outreach:    Phone, spoke to patient.  pateint is scheduled for 9:30 , Friday the 26th call was lost    Questions for provider review:    None                                                                                                                                    Laura Hayes MA        Chart routed to Care Team .

## 2017-09-29 DIAGNOSIS — E78.5 HYPERLIPIDEMIA LDL GOAL <130: ICD-10-CM

## 2017-09-29 DIAGNOSIS — R73.01 IMPAIRED FASTING GLUCOSE: ICD-10-CM

## 2017-09-29 LAB
ALT SERPL W P-5'-P-CCNC: 35 U/L (ref 0–70)
CHOLEST SERPL-MCNC: 178 MG/DL
GLUCOSE SERPL-MCNC: 115 MG/DL (ref 70–99)
HBA1C MFR BLD: 5.5 % (ref 4.3–6)
HDLC SERPL-MCNC: 72 MG/DL
LDLC SERPL CALC-MCNC: 86 MG/DL
NONHDLC SERPL-MCNC: 106 MG/DL
TRIGL SERPL-MCNC: 102 MG/DL

## 2017-09-29 PROCEDURE — 83036 HEMOGLOBIN GLYCOSYLATED A1C: CPT | Performed by: FAMILY MEDICINE

## 2017-09-29 PROCEDURE — 36415 COLL VENOUS BLD VENIPUNCTURE: CPT | Performed by: FAMILY MEDICINE

## 2017-09-29 PROCEDURE — 84460 ALANINE AMINO (ALT) (SGPT): CPT | Performed by: FAMILY MEDICINE

## 2017-09-29 PROCEDURE — 80061 LIPID PANEL: CPT | Performed by: FAMILY MEDICINE

## 2017-09-29 PROCEDURE — 82947 ASSAY GLUCOSE BLOOD QUANT: CPT | Performed by: FAMILY MEDICINE

## 2018-01-24 ENCOUNTER — OFFICE VISIT (OUTPATIENT)
Dept: FAMILY MEDICINE | Facility: CLINIC | Age: 58
End: 2018-01-24
Payer: COMMERCIAL

## 2018-01-24 DIAGNOSIS — Z48.02 VISIT FOR SUTURE REMOVAL: Primary | ICD-10-CM

## 2018-01-24 DIAGNOSIS — I10 HYPERTENSION GOAL BP (BLOOD PRESSURE) < 140/90: ICD-10-CM

## 2018-01-24 PROCEDURE — 99207 ZZC NO CHARGE NURSE ONLY: CPT

## 2018-01-24 NOTE — MR AVS SNAPSHOT
After Visit Summary   1/24/2018    Henrry Cortes    MRN: [id number]           Patient Information     Date Of Birth          1960        Visit Information        Provider Department      1/24/2018 8:30 AM RG RN [facility]        Today's Diagnoses     Visit for suture removal    -  1       Follow-ups after your visit        Who to contact     If you have questions or need follow up information about today's clinic visit or your schedule please contact [facility] directly at [phone].  Normal or non-critical lab and imaging results will be communicated to you by MyChart, letter or phone within 4 business days after the clinic has received the results. If you do not hear from us within 7 days, please contact the clinic through [employer] or phone. If you have a critical or abnormal lab result, we will notify you by phone as soon as possible.  Submit refill requests through [employer] or call your pharmacy and they will forward the refill request to us. Please allow 3 business days for your refill to be completed.          Additional Information About Your Visit        MyChart Information     [employer] gives you secure access to your electronic health record. If you see a primary care provider, you can also send messages to your care team and make appointments. If you have questions, please call your primary care clinic.  If you do not have a primary care provider, please call [phone] and they will assist you.        Care EveryWhere ID     This is your Care EveryWhere ID. This could be used by other organizations to access your [facility] medical records  [id number]         Blood Pressure from Last 3 Encounters:   06/09/17 118/76   06/05/17 (!) 166/102   06/05/17 (!) 166/102    Weight from Last 3 Encounters:   06/09/17 256 lb (116.1 kg)   06/05/17 264 lb 9.6 oz (120 kg)   06/01/16 234 lb 9.6 oz (106.4 kg)              Today, you had the following     No orders found for  display       Primary Care Provider Office Phone # Fax #    Annie Wall [phone] [address] [address]        Equal Access to Services     KHRISCORY RUDY : Hadii aad ku hadjoaquino Soomaali, waaxda luqadaha, qaybta kaalmada adeegyada, segundo josephn shun pradocait coulter. So [facility] [phone].    ATENCIÓN: Si habla español, tiene a pearce disposición servicios gratuitos de asistencia lingüística. Llame al [phone].    We comply with applicable federal civil rights laws and [place] laws. We do not discriminate on the basis of race, color, national origin, age, disability, sex, sexual orientation, or gender identity.            Thank you!     Thank you for choosing [facility]  for your care. Our goal is always to provide you with excellent care. Hearing back from our patients is one way we can continue to improve our services. Please take a few minutes to complete the written survey that you may receive in the mail after your visit with us. Thank you!             Your Updated Medication List - Protect others around you: Learn how to safely use, store and throw away your medicines at www.disposemymeds.org.          This list is accurate as of 1/24/18  8:35 AM.  Always use your most recent med list.                   Brand Name Dispense Instructions for use Diagnosis    ADVIL PO      Take 400 mg by mouth as needed        CIALIS 10 MG tablet   Generic drug:  tadalafil     15    ONE TABLET, TAKEN AT LEAST 30 MINUTES BEFORE INTERCOURSE    Impotence of organic origin       lisinopril-hydrochlorothiazide 20-12.5 MG per tablet    PRINZIDE/ZESTORETIC    90 tablet    TAKE 1 TABLET BY MOUTH DAILY    Hypertension goal BP (blood pressure) < 140/90       simvastatin 20 MG tablet    ZOCOR    90 tablet    Take 1 tablet (20 mg) by mouth At Bedtime    Hyperlipidemia LDL goal <130

## 2018-01-24 NOTE — PROGRESS NOTES
Henrry Cortes presents to the clinic today for suture/staple removal. He was fishing at [place] and cut his hand.   The patient has had the sutures/staples placed on 1/17/2018  There has been no history of infection or drainage.    OBJECTIVE:   3 sutures are seen located on the left thumb.    The wound is healing well with no signs of infection.      ASSESSMENT:   Suture Removal.    PLAN:  VO from DA ok to remove.   All sutures were easily removed today. Steri strips placed as needed. Routine wound care discussed.  The patient will follow up as needed.    Keila Yepez, RN, BSN

## 2018-01-29 RX ORDER — LISINOPRIL AND HYDROCHLOROTHIAZIDE 12.5; 2 MG/1; MG/1
TABLET ORAL
Qty: 30 TABLET | Refills: 5 | Status: SHIPPED | OUTPATIENT
Start: 2018-01-29 | End: 2018-08-15

## 2018-01-29 NOTE — TELEPHONE ENCOUNTER
"Requested Prescriptions   Pending Prescriptions Disp Refills     lisinopril-hydrochlorothiazide (PRINZIDE/ZESTORETIC) 20-12.5 MG per tablet [Pharmacy Med Name: LISINOPRIL-HCTZ 20/12.5MG TABLETS] 30 tablet 0     Sig: TAKE 1 TABLET BY MOUTH DAILY    Diuretics (Including Combos) Protocol Passed    1/24/2018 12:44 PM       Passed - Blood pressure under 140/90    BP Readings from Last 3 Encounters:   06/09/17 118/76   06/05/17 (!) 166/102   06/05/17 (!) 166/102          Passed - Recent or future visit with authorizing provider's specialty    Patient had office visit in the last year or has a visit in the next 30 days with authorizing provider.  See \"Patient Info\" tab in inbasket, or \"Choose Columns\" in Meds & Orders section of the refill encounter.     06/09/2017       Passed - Patient is age 18 or older       Passed - Normal serum creatinine on file in past 12 months    Recent Labs   Lab Test  06/05/17   0842   CR  0.92             Passed - Normal serum potassium on file in past 12 months    Recent Labs   Lab Test  06/05/17   0842   POTASSIUM  4.1                   Passed - Normal serum sodium on file in past 12 months    Recent Labs   Lab Test  06/05/17   0842   NA  141              Prescription approved per [facility] Refill Protocol.  Percy Herrera, RN, BSN        "

## 2018-02-19 ENCOUNTER — MYC MEDICAL ADVICE (OUTPATIENT)
Dept: FAMILY MEDICINE | Facility: CLINIC | Age: 58
End: 2018-02-19

## 2018-04-23 ENCOUNTER — TRANSFERRED RECORDS (OUTPATIENT)
Dept: HEALTH INFORMATION MANAGEMENT | Facility: CLINIC | Age: 58
End: 2018-04-23

## 2018-06-19 ENCOUNTER — MYC MEDICAL ADVICE (OUTPATIENT)
Dept: FAMILY MEDICINE | Facility: CLINIC | Age: 58
End: 2018-06-19

## 2018-06-19 DIAGNOSIS — I10 HYPERTENSION GOAL BP (BLOOD PRESSURE) < 140/90: Primary | ICD-10-CM

## 2018-06-19 DIAGNOSIS — E78.5 HYPERLIPIDEMIA LDL GOAL <130: ICD-10-CM

## 2018-06-19 DIAGNOSIS — R73.01 IMPAIRED FASTING GLUCOSE: ICD-10-CM

## 2018-06-19 NOTE — TELEPHONE ENCOUNTER
Provider, please place lab orders for pt - if you prefer they have them done prior to physical appt.

## 2018-06-19 NOTE — TELEPHONE ENCOUNTER
Huddled with DA, due for CMP, Lipids, hgb A1c, CBC and micro albumin. Left message informing pt he could have fasting labs done before or the day of the appt.    Next 5 appointments (look out 90 days)     Jul 18, 2018  9:00 AM CDT   MyChart Physical Adult with Annie Wall MD   [facility] ([facility])    [address], [address]  [place] [id number] [phone]                Keila Yepez RN, BSN

## 2018-06-26 DIAGNOSIS — E78.5 HYPERLIPIDEMIA LDL GOAL <130: ICD-10-CM

## 2018-06-27 RX ORDER — SIMVASTATIN 20 MG
TABLET ORAL
Qty: 90 TABLET | Refills: 0 | Status: SHIPPED | OUTPATIENT
Start: 2018-06-27 | End: 2018-09-25

## 2018-06-27 NOTE — TELEPHONE ENCOUNTER
"Requested Prescriptions   Pending Prescriptions Disp Refills     simvastatin (ZOCOR) 20 MG tablet [Pharmacy Med Name: SIMVASTATIN 20MG TABLETS] 90 tablet 0     Sig: TAKE 1 TABLET BY MOUTH EVERY NIGHT AT BEDTIME    Statins Protocol Passed    6/26/2018 10:37 AM       Passed - LDL on file in past 12 months    Recent Labs   Lab Test  09/29/17   0922   LDL  86            Passed - No abnormal creatine kinase in past 12 months    No lab results found.            Passed - Recent (12 mo) or future (30 days) visit within the authorizing provider's specialty    Patient had office visit in the last 12 months or has a visit in the next 30 days with authorizing provider or within the authorizing provider's specialty.  See \"Patient Info\" tab in inbasket, or \"Choose Columns\" in Meds & Orders section of the refill encounter.           Passed - Patient is age 18 or older        simvastatin (ZOCOR) 20 MG tablet  Medication is being filled for 1 time refill only due to:  Patient needs to be seen because due for physical.   Next 5 appointments (look out 90 days)     Jul 18, 2018  9:00 AM CDT   MyChart Physical Adult with Annie Wall MD   [facility] ([facility])    [address], [address]  [place] [address] [phone]                Mayra Ramirez, RN, BSN       "

## 2018-07-13 NOTE — PROGRESS NOTES
"SUBJECTIVE:   CC: Henrry Cortes is an 57 year old male who presents for preventative health visit.     Physical   Annual:     Getting at least 3 servings of Calcium per day:  Yes    Bi-annual eye exam:  Yes    Dental care twice a year:  Yes    Sleep apnea or symptoms of sleep apnea:  Daytime drowsiness and Sleep apnea    Diet:  Regular (no restrictions), Carbohydrate counting, Vegetarian/vegan and Gluten-free/reduced    Frequency of exercise:  4-5 days/week    Duration of exercise:  Greater than 60 minutes    Taking medications regularly:  Yes    Medication side effects:  Not applicable    Additional concerns today:  YES    Please address umbilical hernia.    UMBILICAL HERNIA  Still \"sticking out\" but can push it back in.   No concerns. Wondering if he should have surgery. Doesn't like how it looks when working out.         Today's PHQ-2 Score:   PHQ-2 ( 1999 [employer]) 7/16/2018   Q1: Little interest or pleasure in doing things 0   Q2: Feeling down, depressed or hopeless 0   PHQ-2 Score 0   Q1: Little interest or pleasure in doing things Not at all   Q2: Feeling down, depressed or hopeless Not at all   PHQ-2 Score 0       Abuse: Current or Past(Physical, Sexual or Emotional)- No  Do you feel safe in your environment - Yes    Social History   Substance Use Topics     Smoking status: Former Smoker     Packs/day: 0.50     Types: Cigarettes     Quit date: 10/20/2010     Smokeless tobacco: Never Used     Alcohol use 0.5 oz/week      Comment: occasionally     Alcohol Use 7/16/2018   If you drink alcohol do you typically have greater than 3 drinks per day OR greater than 7 drinks per week? No   No flowsheet data found.    Last PSA:   PSA   Date Value Ref Range Status   03/03/2016 0.35 0 - 4 ug/L Final       Reviewed orders with patient. Reviewed health maintenance and updated orders accordingly - Yes  BP Readings from Last 3 Encounters:   07/18/18 110/78   06/09/17 118/76   06/05/17 (!) 166/102    Wt Readings from Last 3 " "Encounters:   07/18/18 229 lb 11.2 oz (104.2 kg)   06/09/17 256 lb (116.1 kg)   06/05/17 264 lb 9.6 oz (120 kg)                    Reviewed and updated as needed this visit by clinical staff  Tobacco  Allergies  Meds  Med Hx  Surg Hx  Fam Hx  Soc Hx        Reviewed and updated as needed this visit by Provider            Review of Systems  CONSTITUTIONAL: NEGATIVE for fever, chills, change in weight  INTEGUMENTARY/SKIN: NEGATIVE for worrisome rashes, moles or lesions  EYES: NEGATIVE for vision changes or irritation  ENT: NEGATIVE for ear, mouth and throat problems  RESP: NEGATIVE for significant cough or SOB  CV: NEGATIVE for chest pain, palpitations or peripheral edema  GI: NEGATIVE for nausea, abdominal pain, heartburn, or change in bowel habits. As above.    male: negative for dysuria, hematuria, decreased urinary stream, erectile dysfunction, urethral discharge  MUSCULOSKELETAL: NEGATIVE for significant arthralgias or myalgia  NEURO: NEGATIVE for weakness, dizziness or paresthesias  PSYCHIATRIC: NEGATIVE for changes in mood or affect    OBJECTIVE:   /78  Pulse 52  Temp 98.4  F (36.9  C) (Temporal)  Resp 16  Ht 5' 10.47\" (1.79 m)  Wt 229 lb 11.2 oz (104.2 kg)  BMI 32.52 kg/m2    Physical Exam  GENERAL: healthy, alert and no distress  EYES: Eyes grossly normal to inspection, PERRL and conjunctivae and sclerae normal  HENT: ear canals and TM's normal, nose and mouth without ulcers or lesions  NECK: no adenopathy, no asymmetry, masses, or scars and thyroid normal to palpation  RESP: lungs clear to auscultation - no rales, rhonchi or wheezes  CV: regular rate and rhythm, normal S1 S2, no S3 or S4, no murmur, click or rub, no peripheral edema and peripheral pulses strong  ABDOMEN: soft, nontender, no hepatosplenomegaly, no masses and bowel sounds normal. Umbilical hernia easily reducible.    (male): normal male genitalia without lesions or urethral discharge, no hernia  RECTAL: normal sphincter " "tone, no rectal masses, prostate normal size, smooth, nontender without nodules or masses  MS: no gross musculoskeletal defects noted, no edema  SKIN: no suspicious lesions or rashes  NEURO: Normal strength and tone, mentation intact and speech normal  PSYCH: mentation appears normal, affect normal/bright    Diagnostic Test Results:  pending    ASSESSMENT/PLAN:   1. Encounter for routine adult medical exam with abnormal findings  See counseling messages      2. Obesity (BMI 30.0-34.9)  Working hard with exercise - cardio and also weights.   Has been losing weight well.   Encouraged to continue    3. Hyperlipidemia LDL goal <130  Awaiting results. Dose adjustment as needed.    - Lipid panel reflex to direct LDL Fasting    4. Hypertension goal BP (blood pressure) < 140/90  Doing well. Well controlled. Tolerating medication.  No change in plan.    - **Comprehensive metabolic panel FUTURE anytime  - **CBC with platelets FUTURE anytime    5. Impaired fasting glucose  Will notify of results.   - **A1C FUTURE 1yr    6. Screening for HIV (human immunodeficiency virus)  Will notify of results.   - HIV Antigen Antibody Combo    7. Umbilical hernia without obstruction and without gangrene  Discussed referral to surgery to discuss repair.   Recommend that this be considered.   He will call back when ready.   He understands if the hernia gets \"stuck\" is red/hot/tender, he should be seen in the ED immediately.     8. Family history of prostate cancer in father  Will notify of results.   - PSA, screen    COUNSELING:   Reviewed preventive health counseling, as reflected in patient instructions  Special attention given to:        Regular exercise       Healthy diet/nutrition       HIV screeninx in teen years, 1x in adult years, and at intervals if high risk       Prostate cancer screening    BP Readings from Last 1 Encounters:   18 110/78     Estimated body mass index is 32.52 kg/(m^2) as calculated from the following:    " "Height as of this encounter: 5' 10.47\" (1.79 m).    Weight as of this encounter: 229 lb 11.2 oz (104.2 kg).      Weight management plan: Discussed healthy diet and exercise guidelines and patient will follow up in 12 months in clinic to re-evaluate.     reports that he quit smoking about 7 years ago. His smoking use included Cigarettes. He smoked 0.50 packs per day. He has never used smokeless tobacco.      Counseling Resources:  ATP IV Guidelines  Pooled Cohorts Equation Calculator  FRAX Risk Assessment  ICSI Preventive Guidelines  Dietary Guidelines for Americans, 2010  USDA's MyPlate  ASA Prophylaxis  Lung CA Screening    TRENT PERES MD, MD  [facility]  Answers for HPI/ROS submitted by the patient on 7/16/2018   PHQ-2 Score: 0    "

## 2018-07-18 ENCOUNTER — OFFICE VISIT (OUTPATIENT)
Dept: FAMILY MEDICINE | Facility: CLINIC | Age: 58
End: 2018-07-18
Payer: COMMERCIAL

## 2018-07-18 VITALS
HEIGHT: 70 IN | TEMPERATURE: 98.4 F | WEIGHT: 229.7 LBS | BODY MASS INDEX: 32.88 KG/M2 | SYSTOLIC BLOOD PRESSURE: 110 MMHG | HEART RATE: 52 BPM | DIASTOLIC BLOOD PRESSURE: 78 MMHG | RESPIRATION RATE: 16 BRPM

## 2018-07-18 DIAGNOSIS — I10 HYPERTENSION GOAL BP (BLOOD PRESSURE) < 140/90: ICD-10-CM

## 2018-07-18 DIAGNOSIS — Z11.4 SCREENING FOR HIV (HUMAN IMMUNODEFICIENCY VIRUS): ICD-10-CM

## 2018-07-18 DIAGNOSIS — R73.01 IMPAIRED FASTING GLUCOSE: ICD-10-CM

## 2018-07-18 DIAGNOSIS — Z00.01 ENCOUNTER FOR ROUTINE ADULT MEDICAL EXAM WITH ABNORMAL FINDINGS: Primary | ICD-10-CM

## 2018-07-18 DIAGNOSIS — Z80.42 FAMILY HISTORY OF PROSTATE CANCER IN FATHER: ICD-10-CM

## 2018-07-18 DIAGNOSIS — K42.9 UMBILICAL HERNIA WITHOUT OBSTRUCTION AND WITHOUT GANGRENE: ICD-10-CM

## 2018-07-18 DIAGNOSIS — E78.5 HYPERLIPIDEMIA LDL GOAL <130: ICD-10-CM

## 2018-07-18 DIAGNOSIS — E66.811 OBESITY (BMI 30.0-34.9): ICD-10-CM

## 2018-07-18 LAB
ALBUMIN SERPL-MCNC: 4.3 G/DL (ref 3.4–5)
ALP SERPL-CCNC: 51 U/L (ref 40–150)
ALT SERPL W P-5'-P-CCNC: 33 U/L (ref 0–70)
ANION GAP SERPL CALCULATED.3IONS-SCNC: 5 MMOL/L (ref 3–14)
AST SERPL W P-5'-P-CCNC: 29 U/L (ref 0–45)
BILIRUB SERPL-MCNC: 0.6 MG/DL (ref 0.2–1.3)
BUN SERPL-MCNC: 23 MG/DL (ref 7–30)
CALCIUM SERPL-MCNC: 9.6 MG/DL (ref 8.5–10.1)
CHLORIDE SERPL-SCNC: 100 MMOL/L (ref 94–109)
CHOLEST SERPL-MCNC: 219 MG/DL
CO2 SERPL-SCNC: 32 MMOL/L (ref 20–32)
CREAT SERPL-MCNC: 1.12 MG/DL (ref 0.66–1.25)
ERYTHROCYTE [DISTWIDTH] IN BLOOD BY AUTOMATED COUNT: 14 % (ref 10–15)
GFR SERPL CREATININE-BSD FRML MDRD: 68 ML/MIN/1.7M2
GLUCOSE SERPL-MCNC: 111 MG/DL (ref 70–99)
HBA1C MFR BLD: 5.7 % (ref 0–5.6)
HCT VFR BLD AUTO: 48.3 % (ref 40–53)
HDLC SERPL-MCNC: 86 MG/DL
HGB BLD-MCNC: 16.8 G/DL (ref 13.3–17.7)
LDLC SERPL CALC-MCNC: 118 MG/DL
MCH RBC QN AUTO: 31.9 PG (ref 26.5–33)
MCHC RBC AUTO-ENTMCNC: 34.8 G/DL (ref 31.5–36.5)
MCV RBC AUTO: 92 FL (ref 78–100)
NONHDLC SERPL-MCNC: 133 MG/DL
PLATELET # BLD AUTO: 220 10E9/L (ref 150–450)
POTASSIUM SERPL-SCNC: 4.9 MMOL/L (ref 3.4–5.3)
PROT SERPL-MCNC: 7.9 G/DL (ref 6.8–8.8)
PSA SERPL-ACNC: 0.31 UG/L (ref 0–4)
RBC # BLD AUTO: 5.26 10E12/L (ref 4.4–5.9)
SODIUM SERPL-SCNC: 137 MMOL/L (ref 133–144)
TRIGL SERPL-MCNC: 76 MG/DL
WBC # BLD AUTO: 7.5 10E9/L (ref 4–11)

## 2018-07-18 PROCEDURE — 80053 COMPREHEN METABOLIC PANEL: CPT | Performed by: FAMILY MEDICINE

## 2018-07-18 PROCEDURE — G0103 PSA SCREENING: HCPCS | Performed by: FAMILY MEDICINE

## 2018-07-18 PROCEDURE — 80061 LIPID PANEL: CPT | Performed by: FAMILY MEDICINE

## 2018-07-18 PROCEDURE — 83036 HEMOGLOBIN GLYCOSYLATED A1C: CPT | Performed by: FAMILY MEDICINE

## 2018-07-18 PROCEDURE — 99396 PREV VISIT EST AGE 40-64: CPT | Performed by: FAMILY MEDICINE

## 2018-07-18 PROCEDURE — 87389 HIV-1 AG W/HIV-1&-2 AB AG IA: CPT | Performed by: FAMILY MEDICINE

## 2018-07-18 PROCEDURE — 85027 COMPLETE CBC AUTOMATED: CPT | Performed by: FAMILY MEDICINE

## 2018-07-18 PROCEDURE — 36415 COLL VENOUS BLD VENIPUNCTURE: CPT | Performed by: FAMILY MEDICINE

## 2018-07-18 ASSESSMENT — PAIN SCALES - GENERAL: PAINLEVEL: NO PAIN (0)

## 2018-07-18 NOTE — MR AVS SNAPSHOT
After Visit Summary   7/18/2018    Henrry Cortes    MRN: [id number]           Patient Information     Date Of Birth          1960        Visit Information        Provider Department      7/18/2018 9:00 AM Annie Wall MD [facility]        Today's Diagnoses     Encounter for routine adult medical exam with abnormal findings    -  1    Obesity (BMI 30.0-34.9)        Hyperlipidemia LDL goal <130        Hypertension goal BP (blood pressure) < 140/90        Impaired fasting glucose        Screening for HIV (human immunodeficiency virus)        Umbilical hernia without obstruction and without gangrene        Family history of prostate cancer in father          Care Instructions      Preventive Health Recommendations  Male Ages 50 - 64    Yearly exam:             See your health care provider every year in order to  o   Review health changes.   o   Discuss preventive care.    o   Review your medicines if your doctor has prescribed any.     Have a cholesterol test every 5 years, or more frequently if you are at risk for high cholesterol/heart disease.     Have a diabetes test (fasting glucose) every three years. If you are at risk for diabetes, you should have this test more often.     Have a colonoscopy at age 50, or have a yearly FIT test (stool test). These exams will check for colon cancer.      Talk with your health care provider about whether or not a prostate cancer screening test (PSA) is right for you.    You should be tested each year for STDs (sexually transmitted diseases), if you re at risk.     Shots: Get a flu shot each year. Get a tetanus shot every 10 years.     Nutrition:    Eat at least 5 servings of fruits and vegetables daily.     Eat whole-grain bread, whole-wheat pasta and brown rice instead of white grains and rice.     Get adequate Calcium and Vitamin D.     Lifestyle    Exercise for at least 150 minutes a week (30 minutes a day, 5 days a week). This will help  "you control your weight and prevent disease.     Limit alcohol to one drink per day.     No smoking.     Wear sunscreen to prevent skin cancer.     See your dentist every six months for an exam and cleaning.     See your eye doctor every 1 to 2 years.            Follow-ups after your visit        Follow-up notes from your care team     Return in about 1 year (around 7/18/2019) for Physical Exam.      Who to contact     If you have questions or need follow up information about today's clinic visit or your schedule please contact [facility] directly at [phone].  Normal or non-critical lab and imaging results will be communicated to you by [employer], letter or phone within 4 business days after the clinic has received the results. If you do not hear from us within 7 days, please contact the clinic through [employer] or phone. If you have a critical or abnormal lab result, we will notify you by phone as soon as possible.  Submit refill requests through [employer] or call your pharmacy and they will forward the refill request to us. Please allow 3 business days for your refill to be completed.          Additional Information About Your Visit        MyChart Information     [employer] gives you secure access to your electronic health record. If you see a primary care provider, you can also send messages to your care team and make appointments. If you have questions, please call your primary care clinic.  If you do not have a primary care provider, please call [phone] and they will assist you.        Care EveryWhere ID     This is your Care EveryWhere ID. This could be used by other organizations to access your [place] medical records  [id number]        Your Vitals Were     Pulse Temperature Respirations Height BMI (Body Mass Index)       52 98.4  F (36.9  C) (Temporal) 16 5' 10.47\" (1.79 m) 32.52 kg/m2        Blood Pressure from Last 3 Encounters:   07/18/18 110/78   06/09/17 118/76   06/05/17 (!) 166/102    " Weight from Last 3 Encounters:   07/18/18 229 lb 11.2 oz (104.2 kg)   06/09/17 256 lb (116.1 kg)   06/05/17 264 lb 9.6 oz (120 kg)              We Performed the Following     **A1C FUTURE 1yr     **CBC with platelets FUTURE anytime     **Comprehensive metabolic panel FUTURE anytime     HIV Antigen Antibody Combo     Lipid panel reflex to direct LDL Fasting     PSA, screen        Primary Care Provider Office Phone # Fax #    Annie Wall [phone] [address] [facility] [address]        Equal Access to Services     [facility]: Hadii aad ku hadasho Soomaali, waaxda luqadaha, qaybta kaalmada adeegyada, segundo llamas haycait kaiser . So [facility] [phone].    ATENCIÓN: Si habla español, tiene a pearce disposición servicios gratuitos de asistencia lingüística. [facility] [phone].    We comply with applicable federal civil rights laws and [place] laws. We do not discriminate on the basis of race, color, national origin, age, disability, sex, sexual orientation, or gender identity.            Thank you!     Thank you for choosing [facility]  for your care. Our goal is always to provide you with excellent care. Hearing back from our patients is one way we can continue to improve our services. Please take a few minutes to complete the written survey that you may receive in the mail after your visit with us. Thank you!             Your Updated Medication List - Protect others around you: Learn how to safely use, store and throw away your medicines at www.disposemymeds.org.          This list is accurate as of 7/18/18  9:25 AM.  Always use your most recent med list.                   Brand Name Dispense Instructions for use Diagnosis    ADVIL PO      Take 400 mg by mouth as needed        CIALIS 10 MG tablet   Generic drug:  tadalafil     15    ONE TABLET, TAKEN AT LEAST 30 MINUTES BEFORE INTERCOURSE    Impotence of organic origin       *  lisinopril-hydrochlorothiazide 20-12.5 MG per tablet    PRINZIDE/ZESTORETIC    90 tablet    TAKE 1 TABLET BY MOUTH DAILY    Hypertension goal BP (blood pressure) < 140/90       * lisinopril-hydrochlorothiazide 20-12.5 MG per tablet    PRINZIDE/ZESTORETIC    30 tablet    TAKE 1 TABLET BY MOUTH DAILY    Hypertension goal BP (blood pressure) < 140/90       simvastatin 20 MG tablet    ZOCOR    90 tablet    TAKE 1 TABLET BY MOUTH EVERY NIGHT AT BEDTIME    Hyperlipidemia LDL goal <130       * Notice:  This list has 2 medication(s) that are the same as other medications prescribed for you. Read the directions carefully, and ask your doctor or other care provider to review them with you.

## 2018-07-19 LAB — HIV 1+2 AB+HIV1 P24 AG SERPL QL IA: NONREACTIVE

## 2018-08-15 DIAGNOSIS — I10 HYPERTENSION GOAL BP (BLOOD PRESSURE) < 140/90: ICD-10-CM

## 2018-08-15 RX ORDER — LISINOPRIL AND HYDROCHLOROTHIAZIDE 12.5; 2 MG/1; MG/1
TABLET ORAL
Qty: 30 TABLET | Refills: 10 | Status: SHIPPED | OUTPATIENT
Start: 2018-08-15 | End: 2019-05-23 | Stop reason: ALTCHOICE

## 2018-08-15 NOTE — TELEPHONE ENCOUNTER
"Requested Prescriptions   Pending Prescriptions Disp Refills     lisinopril-hydrochlorothiazide (PRINZIDE/ZESTORETIC) 20-12.5 MG per tablet [Pharmacy Med Name: LISINOPRIL-HCTZ 20/12.5MG TABLETS] 30 tablet 0     Sig: TAKE 1 TABLET BY MOUTH DAILY    Diuretics (Including Combos) Protocol Passed    8/15/2018  8:56 AM       Passed - Blood pressure under 140/90 in past 12 months    BP Readings from Last 3 Encounters:   07/18/18 110/78   06/09/17 118/76   06/05/17 (!) 166/102          Passed - Recent (12 mo) or future (30 days) visit within the authorizing provider's specialty    Patient had office visit in the last 12 months or has a visit in the next 30 days with authorizing provider or within the authorizing provider's specialty.  See \"Patient Info\" tab in inbasket, or \"Choose Columns\" in Meds & Orders section of the refill encounter.           Passed - Patient is age 18 or older       Passed - Normal serum creatinine on file in past 12 months    Recent Labs   Lab Test  07/18/18   0926   CR  1.12          Passed - Normal serum potassium on file in past 12 months    Recent Labs   Lab Test  07/18/18   0926   POTASSIUM  4.9          Passed - Normal serum sodium on file in past 12 months    Recent Labs   Lab Test  07/18/18   0926   NA  137            lisinopril-hydrochlorothiazide (PRINZIDE/ZESTORETIC) 20-12.5 MG per tablet  Prescription approved per [facility] Refill Protocol.  Mayra Ramirez, RN, BSN     "

## 2018-09-25 DIAGNOSIS — E78.5 HYPERLIPIDEMIA LDL GOAL <130: ICD-10-CM

## 2018-09-26 RX ORDER — SIMVASTATIN 20 MG
TABLET ORAL
Qty: 90 TABLET | Refills: 2 | Status: SHIPPED | OUTPATIENT
Start: 2018-09-26 | End: 2019-08-30

## 2018-09-26 NOTE — TELEPHONE ENCOUNTER
Zocor:  Prescription approved per [facility] Refill Protocol.    Suzie Parker, RN, BSN

## 2019-02-11 ENCOUNTER — MYC MEDICAL ADVICE (OUTPATIENT)
Dept: FAMILY MEDICINE | Facility: CLINIC | Age: 59
End: 2019-02-11

## 2019-02-11 DIAGNOSIS — G47.33 OSA (OBSTRUCTIVE SLEEP APNEA): Primary | ICD-10-CM

## 2019-02-14 ENCOUNTER — OFFICE VISIT (OUTPATIENT)
Dept: SLEEP MEDICINE | Facility: CLINIC | Age: 59
End: 2019-02-14
Payer: COMMERCIAL

## 2019-02-14 VITALS
HEIGHT: 71 IN | DIASTOLIC BLOOD PRESSURE: 87 MMHG | HEART RATE: 55 BPM | SYSTOLIC BLOOD PRESSURE: 155 MMHG | WEIGHT: 254 LBS | OXYGEN SATURATION: 98 % | BODY MASS INDEX: 35.56 KG/M2

## 2019-02-14 DIAGNOSIS — G47.33 OSA (OBSTRUCTIVE SLEEP APNEA): ICD-10-CM

## 2019-02-14 DIAGNOSIS — G47.33 OBSTRUCTIVE SLEEP APNEA: Primary | ICD-10-CM

## 2019-02-14 PROBLEM — E66.01 MORBID OBESITY DUE TO EXCESS CALORIES (H): Chronic | Status: ACTIVE | Noted: 2017-06-09

## 2019-02-14 PROCEDURE — 99213 OFFICE O/P EST LOW 20 MIN: CPT | Performed by: PHYSICIAN ASSISTANT

## 2019-02-14 ASSESSMENT — MIFFLIN-ST. JEOR: SCORE: 2004.27

## 2019-02-14 NOTE — PATIENT INSTRUCTIONS
Your BMI is Body mass index is 35.43 kg/m .  Weight management is a personal decision.  If you are interested in exploring weight loss strategies, the following discussion covers the approaches that may be successful. Body mass index (BMI) is one way to tell whether you are at a healthy weight, overweight, or obese. It measures your weight in relation to your height.  A BMI of 18.5 to 24.9 is in the healthy range. A person with a BMI of 25 to 29.9 is considered overweight, and someone with a BMI of 30 or greater is considered obese. More than two-thirds of American adults are considered overweight or obese.  Being overweight or obese increases the risk for further weight gain. Excess weight may lead to heart disease and diabetes.  Creating and following plans for healthy eating and physical activity may help you improve your health.  Weight control is part of healthy lifestyle and includes exercise, emotional health, and healthy eating habits. Careful eating habits lifelong are the mainstay of weight control. Though there are significant health benefits from weight loss, long-term weight loss with diet alone may be very difficult to achieve- studies show long-term success with dietary management in less than 10% of people. Attaining a healthy weight may be especially difficult to achieve in those with severe obesity. In some cases, medications, devices and surgical management might be considered.  What can you do?  If you are overweight or obese and are interested in methods for weight loss, you should discuss this with your provider.     Consider reducing daily calorie intake by 500 calories.     Keep a food journal.     Avoiding skipping meals, consider cutting portions instead.    Diet combined with exercise helps maintain muscle while optimizing fat loss. Strength training is particularly important for building and maintaining muscle mass. Exercise helps reduce stress, increase energy, and improves fitness.  Increasing exercise without diet control, however, may not burn enough calories to loose weight.       Start walking three days a week 10-20 minutes at a time    Work towards walking thirty minutes five days a week     Eventually, increase the speed of your walking for 1-2 minutes at time    In addition, we recommend that you review healthy lifestyles and methods for weight loss available through the National Institutes of Health patient information sites:  http://win.niddk.nih.gov/publications/index.htm    And look into health and wellness programs that may be available through your health insurance provider, employer, local community center, or fr[employer]a club.    Weight management plan: Patient was referred to their PCP to discuss a diet and exercise plan.

## 2019-02-14 NOTE — NURSING NOTE
"Chief Complaint   Patient presents with     Sleep Problem     Consult- cpap prescription has .        Initial /82   Pulse 55   Ht 1.803 m (5' 11\")   Wt 115.2 kg (254 lb)   SpO2 98%   BMI 35.43 kg/m   Estimated body mass index is 35.43 kg/m  as calculated from the following:    Height as of this encounter: 1.803 m (5' 11\").    Weight as of this encounter: 115.2 kg (254 lb).    Medication Reconciliation: complete    Neck circumference: 16.25 inches / 41 centimeters.        "

## 2019-02-14 NOTE — PROGRESS NOTES
"  Sleep Consultation:    Date on this visit: 2019    Henrry Cortes is sent by Annie Wall for a sleep consultation regarding previously diagnosed obstructive sleep apnea.    Primary Physician: Annie Wall     Chief Complaint   Patient presents with     Sleep Problem     Consult- cpap prescription has .      Henrry Cortes initially presented in  for snoring, non-refreshing sleep and daytime sleepiness. A polysomnogram was recommended and completed on 12(214#). During the first portion of the study night the patient was found to have mild DELANEY (AHI 8, supine AHI was 18.8, O2 hemant was 87%). A CPAP titration was not able to be completed during this study night.   There were 223 were classified as PLMs. Of the PLMs, 7 were associated with arousals.     He started auto-titrating CPAP 5-18 cm/H20 on 12. He stopped using CPAP in  for \"no good reason\". He states that he wants to get back on CPAP for non-refreshing sleep and daytime sleepiness with ESS of 13.     Henrry has gained 40 pounds since his last sleep study.      Henrry goes to sleep at 10:00 PM during the week. He wakes up at 6:00 AM with an alarm. He falls asleep in 5 minutes.  Henrry denies difficulty falling asleep.  He wakes up 4-5 times a night for 5 minutes before falling back to sleep.  Henrry wakes up to go to the bathroom and snoring.  On weekends, Henrry goes to sleep at 10:00 PM.  He wakes up at 7:00 AM without an alarm. He falls asleep in 5 minutes.  Patient gets 6-7 hours of sleep per night.     Patient does use electronics in bed and does not watch TV in bed.     Henrry does not do shift work.  He works day shifts.      Henrry does snore every night and snoring is loud. Patient does have a regular bed partner. There is report of snoring.  He does have witnessed apneas. They occasionally sleep separately.  Patient sleeps on his back and side. He denies morning headaches, morning confusion and restless legs. Henrry " "denies any bruxism, sleep walking, sleep talking, dream enactment, sleep paralysis, cataplexy and hypnogogic/hypnopompic hallucinations.    Henrry denies difficulty breathing through his nose, claustrophobia and reflux at night.      Patient describes themself as a morning person.  He would prefer to go to sleep at 10:00 PM and wake up at 7:00 AM.      Henrry does not nap.  He takes some inadvertant naps.  He denies falling asleep while driving. Patient was counseled on the importance of driving while alert, to pull over if drowsy, or nap before getting into the vehicle if sleepy.    Allergies:    Allergies   Allergen Reactions     No Known Drug Allergies        Medications:    Current Outpatient Medications   Medication Sig Dispense Refill     CIALIS 10 MG OR TABS ONE TABLET, TAKEN AT LEAST 30 MINUTES BEFORE INTERCOURSE 15 1 YEAR     Ibuprofen (ADVIL PO) Take 400 mg by mouth as needed       lisinopril-hydrochlorothiazide (PRINZIDE/ZESTORETIC) 20-12.5 MG per tablet TAKE 1 TABLET BY MOUTH DAILY 30 tablet 10     simvastatin (ZOCOR) 20 MG tablet TAKE 1 TABLET BY MOUTH EVERY NIGHT AT BEDTIME 90 tablet 2       Problem List:  Patient Active Problem List    Diagnosis Date Noted     Morbid obesity due to excess calories (H) 06/09/2017     Priority: Medium     Umbilical hernia without obstruction and without gangrene 06/09/2017     Priority: Medium     Family history of bicuspid cardiac valve 08/11/2016     Priority: Medium     Family history of disease of aorta 08/11/2016     Priority: Medium     DELANEY (obstructive sleep apnea)-Mild (AHI 8) 06/21/2012     Priority: Medium     Indications for Polysomnogram 6/19/2012: The patient is a 51 y year old Male who is 5' 11\" and weighs 214.0 lbs.  His BMI equals 30.0, [place] sleepiness scale 11.0 and neck size is 17\".  A full night polysomnogram was performed to evaluate for snoring and sleepiness.    Polysomnogram Data:  A full night polysomnogram recorded the standard physiologic " parameters including EEG, EOG, EMG, EKG, nasal and oral airflow.  Respiratory parameters of chest and abdominal movements were recorded with respiratory inductance plethysmography.  Oxygen saturation was recorded by pulse oximetry.      Sleep Architecture:  The total recording time of the polysomnogram was 416.2 minutes.  The total sleep time was 382.0 minutes.  Sleep latency was 3.8 minutes.  REM latency was 38.5 minutes.  Arousal Index was 27.3.  Sleep Efficiency was 91.8%.  Wake after sleep onset was 31.0.  The patient spent 3.1% of total sleep time in Stage N1, 49.0% in Stage N2, 16.6% in Stages N3, and 31.3% in REM.       Respiration:      Sustained Sleep Associated Hypoventilation -was not monitored.    Sleep Associated Hypoxemia - was present.  Baseline oxygen saturation was 94.7%.  Snoring was reported as loud.  Lowest oxygen saturation was 87.0%    Events - The polysomnogram revealed a presence of 12 obstructive, no central, and no mixed apneas resulting in an Apnea index of 1.9 events per hour.  There were 40 hypopneas resulting in a Hypopnea index of 6.3 events per hour.  The combined Apnea/Hypopnea Index was 8.2 events per hour.  The REM AHI is 4.0.  The supine AHI is 18.8.  The RERA index was 10.4 per hour.   The RDI was  .    Movement Activity:      Limb Notes - There were 265 limb movements recorded.  Of this total, 223 were classified as PLMs.  Of the PLMs, 7 were associated with arousals.  The Limb Movement index was 41.6 per hour while the PLM index was 35.0per hour.    Behavior - none    Bruxism - none    Seizures - none    Cardiac Summary:   The average pulse rate was 49.6 bpm.  The minimum pulse rate was 38.9 bpm while the maximum pulse rate was 80.5 bpm.    Assessment:     Mild DELANEY (AHI 8)    Recommendations:    Options include a dental appliance if dentition allows, auto-titrating CPAP, ENT evaluation for surgery in carefully selected patients. Clinical correlation advised.     Advise  regarding the risks of drowsy driving    Suggest optimizing sleep hygiene and avoiding sleep deprivation    Weight management    Dopaminergic therapy should be used for management of restless legs syndrome and not based on the presence of periodic limb movements alone       Impaired fasting glucose 03/06/2012     Priority: Medium     Snoring 03/06/2012     Priority: Medium     Plantar fasciitis 03/06/2012     Priority: Medium     Hypertension goal BP (blood pressure) < 140/90 03/06/2012     Priority: Medium     HYPERLIPIDEMIA LDL GOAL <130 10/31/2010     Priority: Medium        Past Medical/Surgical History:  Past Medical History:   Diagnosis Date     Essential hypertension, benign      Lateral epicondylitis      Mixed hyperlipidemia      Obesity, unspecified 4/5/2006    lost 50#     Sleep apnea     has CPAP     Past Surgical History:   Procedure Laterality Date     BIOPSY  2014    benign cysts on left forearm     COLONOSCOPY  9/20/2013    Procedure: COLONOSCOPY;  Colonscopy/Screen for colon cancer  [facility]  PKT sent 8/14/13 [facility];  Surgeon: Annie Wall MD;  Location: [facility] OR     HERNIA REPAIR, UMBILICAL  08/06/2004    with mesh     LASIK  2003,2004    had revisions x 2     ORTHOPEDIC SURGERY  6/9/2016    Right Hip replacement     REMOVAL OF SPERM DUCT(S)  2000       Social History:  Social History     Socioeconomic History     Marital status:      Spouse name: Yuni     Number of children: 3     Years of education: 16     Highest education level: Not on file   Social Needs     Financial resource strain: Not on file     Food insecurity - worry: Not on file     Food insecurity - inability: Not on file     Transportation needs - medical: Not on file     Transportation needs - non-medical: Not on file   Occupational History     Occupation:    Tobacco Use     Smoking status: Former Smoker     Packs/day: 0.50     Types: Cigarettes     Last attempt to quit: 10/20/2010     Years since  quittin.3     Smokeless tobacco: Never Used   Substance and Sexual Activity     Alcohol use: Yes     Alcohol/week: 0.5 oz     Comment: occasionally     Drug use: No     Sexual activity: Yes     Partners: Female     Birth control/protection: Surgical     Comment: vasectomy   Other Topics Concern      Service No     Blood Transfusions No     Caffeine Concern Yes     Occupational Exposure No     Hobby Hazards No     Sleep Concern No     Stress Concern No     Weight Concern Yes     Special Diet No     Comment: portion     Back Care No     Exercise Yes     Comment: trying to get back into an exercise program     Bike Helmet Yes     Seat Belt Yes     Self-Exams No     Parent/sibling w/ CABG, MI or angioplasty before 65F 55M? No   Social History Narrative     Not on file       Family History:  Family History   Problem Relation Age of Onset     Heart Disease Father         composite valve/aotic graft and triple bypass age 69     Hypertension Father      Cancer Father      Cerebrovascular Disease Father         Post bypass surgery     Prostate Cancer Father      Cardiovascular Father      Lipids Father      Hypertension Mother      Alzheimer Disease Mother         dementia     Hyperlipidemia Mother      Unknown/Adopted Mother         Vascular Dementia     Diabetes Paternal Grandmother      Genitourinary Problems Maternal Grandmother         kidney     Cancer Sister 47        breast     Breast Cancer Sister      Breast Cancer Sister      Asthma Son         mild     Breast Cancer Sister      C.A.D. No family hx of      Cancer - colorectal No family hx of      Alcohol/Drug No family hx of      Allergies No family hx of      Anesthesia Reaction No family hx of      Arthritis No family hx of      Blood Disease No family hx of      Circulatory No family hx of      Congenital Anomalies No family hx of      Connective Tissue Disorder No family hx of      Depression No family hx of      Eye Disorder No family hx of       "Genetic Disorder No family hx of      Gastrointestinal Disease No family hx of      Gynecology No family hx of      Musculoskeletal Disorder No family hx of      Neurologic Disorder No family hx of      Obesity No family hx of      Osteoporosis No family hx of      Psychotic Disorder No family hx of      Respiratory No family hx of      Thyroid Disease No family hx of      Hearing Loss No family hx of        Review of Systems:  A complete review of systems reviewed by me is negative with the exeption of what has been mentioned in the history of present illness.  CONSTITUTIONAL: NEGATIVE for weight gain/loss, fever, chills, sweats or night sweats, drug allergies.  EYES: NEGATIVE for changes in vision, blind spots, double vision.  ENT: NEGATIVE for ear pain, sore throat, sinus pain, post-nasal drip, runny nose, bloody nose  CARDIAC: NEGATIVE for fast heartbeats or fluttering in chest, chest pain or pressure, breathlessness when lying flat, swollen legs or swollen feet.  NEUROLOGIC: NEGATIVE headaches, weakness or numbness in the arms or legs.  DERMATOLOGIC: NEGATIVE for rashes, new moles or change in mole(s)  PULMONARY: NEGATIVE SOB at rest, SOB with activity, dry cough, productive cough, coughing up blood, wheezing or whistling when breathing.    GASTROINTESTINAL: NEGATIVE for nausea or vomitting, loose or watery stools, fat or grease in stools, constipation, abdominal pain, bowel movements black in color or blood noted.  GENITOURINARY: NEGATIVE for pain during urination, blood in urine, urinating more frequently than usual, irregular menstrual periods.  MUSCULOSKELETAL: NEGATIVE for muscle pain, bone or joint pain, swollen joints.  ENDOCRINE: NEGATIVE for increased thirst or urination, diabetes.  LYMPHATIC: NEGATIVE for swollen lymph nodes, lumps or bumps in the breasts or nipple discharge.    Physical Examination:  Vitals: /87   Pulse 55   Ht 1.803 m (5' 11\")   Wt 115.2 kg (254 lb)   SpO2 98%   BMI 35.43 " kg/m     BMI= Body mass index is 35.43 kg/m .    Neck Cir (cm): 41 cm    [place] Total Score 2/14/2019   Total score - [place] 13       GENERAL APPEARANCE: alert and no distress  EYES: Eyes grossly normal to inspection, PERRL and conjunctivae and sclerae normal  HENT: oropharynx crowded  NECK: no asymmetry, masses, or scars  PSYCH: mentation appears normal and affect normal/bright  Mallampati Class: III.  Tonsillar Stage:     Last Comprehensive Metabolic Panel:  Sodium   Date Value Ref Range Status   07/18/2018 137 133 - 144 mmol/L Final     Potassium   Date Value Ref Range Status   07/18/2018 4.9 3.4 - 5.3 mmol/L Final     Chloride   Date Value Ref Range Status   07/18/2018 100 94 - 109 mmol/L Final     Carbon Dioxide   Date Value Ref Range Status   07/18/2018 32 20 - 32 mmol/L Final     Anion Gap   Date Value Ref Range Status   07/18/2018 5 3 - 14 mmol/L Final     Glucose   Date Value Ref Range Status   07/18/2018 111 (H) 70 - 99 mg/dL Final     Urea Nitrogen   Date Value Ref Range Status   07/18/2018 23 7 - 30 mg/dL Final     Creatinine   Date Value Ref Range Status   07/18/2018 1.12 0.66 - 1.25 mg/dL Final     GFR Estimate   Date Value Ref Range Status   07/18/2018 68 >60 mL/min/1.7m2 Final     Comment:     Non  GFR Calc     Calcium   Date Value Ref Range Status   07/18/2018 9.6 8.5 - 10.1 mg/dL Final     TSH   Date Value Ref Range Status   05/10/2012 1.73 0.4 - 5.0 mU/L Final     Impression:  Mild obstructive sleep apnea by sleep study on 6/19/12(214#)-AHI 8, supine AHI was 18.8, O2 hemant was 87%. He stopped CPAP in 2016.  Interim 40# weight gain and as such suspect worsening of DELANEY severity. Presenting concerns of non refreshing sleep, nocturnal awakenings and daytime sleepiness(ESS 13)  PLAN:   We reviewed options.  Re-initiate auto-CPAP  5-18 cm/H20. He will undergo mask fitting and obtain CPAP supplies via [facility].   Obstructive sleep apnea reviewed.  Complications of untreated sleep apnea were  reviewed.    Hortencia Wright PA-C    CC: Annie Wall

## 2019-05-20 ENCOUNTER — OFFICE VISIT (OUTPATIENT)
Dept: FAMILY MEDICINE | Facility: CLINIC | Age: 59
End: 2019-05-20
Payer: COMMERCIAL

## 2019-05-20 VITALS
TEMPERATURE: 98.5 F | HEART RATE: 52 BPM | BODY MASS INDEX: 36.29 KG/M2 | SYSTOLIC BLOOD PRESSURE: 120 MMHG | WEIGHT: 260.2 LBS | RESPIRATION RATE: 16 BRPM | DIASTOLIC BLOOD PRESSURE: 82 MMHG

## 2019-05-20 DIAGNOSIS — M10.072 ACUTE IDIOPATHIC GOUT OF LEFT FOOT: Primary | ICD-10-CM

## 2019-05-20 DIAGNOSIS — E66.812 CLASS 2 OBESITY WITHOUT SERIOUS COMORBIDITY WITH BODY MASS INDEX (BMI) OF 36.0 TO 36.9 IN ADULT, UNSPECIFIED OBESITY TYPE: ICD-10-CM

## 2019-05-20 PROCEDURE — 99213 OFFICE O/P EST LOW 20 MIN: CPT | Performed by: NURSE PRACTITIONER

## 2019-05-20 ASSESSMENT — PAIN SCALES - GENERAL: PAINLEVEL: MILD PAIN (2)

## 2019-05-20 NOTE — PATIENT INSTRUCTIONS
Continue your blood pressure regimen.  See your Dr. Wall in July for routine physical.    Christine Perez, CNP

## 2019-05-20 NOTE — PROGRESS NOTES
Subjective     Henrry Cortes is a 58 year old male who presents to clinic today for the following health issues:    History of Present Illness     He eats 4 or more servings of fruits and vegetables daily.He consumes 0 sweetened beverage(s) daily.  He is taking medications regularly.     Gout/ single inflamed joint   Onset: a week ago    Description:   Location: foot - left  Joint Swelling: YES  Redness: YES  Pain: YES    Intensity: mild    Progression of Symptoms:  improving    Accompanying Signs & Symptoms:  Fevers: no     History:   Trauma to the area: no   Previous history of gout: no    Recent illness:  no     Precipitating factors:   Diet-rich in purine: red meat and alcohol -ocasssionally  Alcohol use: YES- none at the moment but was drinking 3 cans of beer a day  Diuretic use: YES    Alleviating factors:  Ice and ibuprofen     Therapies Tried and outcome: none and ice      Additional HPI:  Mr. Cortes presents to clinic after first episode of gout to left great toe.  It is resolving - he has been taking Ibuprofen and icing it, which has helped.  He did not seek medical care as he states he knows what it is and how to treat it.  His main concern/question is if his blood pressure medication has contributed to this gout episode.  His son is a doctor and told him Thiazide can contribute to gout episodes and he should come in for a medication check. VSS.  No abnormal blood pressures outside of clinic.      Patient Active Problem List   Diagnosis     HYPERLIPIDEMIA LDL GOAL <130     Impaired fasting glucose     Snoring     Plantar fasciitis     Hypertension goal BP (blood pressure) < 140/90     DELANEY (obstructive sleep apnea)-Mild (AHI 8)     Family history of bicuspid cardiac valve     Family history of disease of aorta     Morbid obesity due to excess calories (H)     Umbilical hernia without obstruction and without gangrene     Past Surgical History:   Procedure Laterality Date     BIOPSY  2014    benign cysts on  left forearm     COLONOSCOPY  2013    Procedure: COLONOSCOPY;  Colonscopy/Screen for colon cancer  [facility]- [facility]  PKT sent 13 sah;  Surgeon: Annie Wall MD;  Location: [facility] OR     HERNIA REPAIR, UMBILICAL  2004    with mesh     LASIK  ,    had revisions x 2     ORTHOPEDIC SURGERY  2016    Right Hip replacement     REMOVAL OF SPERM DUCT(S)         Social History     Tobacco Use     Smoking status: Former Smoker     Packs/day: 0.50     Types: Cigarettes     Last attempt to quit: 10/20/2010     Years since quittin.5     Smokeless tobacco: Never Used   Substance Use Topics     Alcohol use: Yes     Alcohol/week: 0.5 oz     Comment: occasionally     Family History   Problem Relation Age of Onset     Heart Disease Father         composite valve/aotic graft and triple bypass age 69     Hypertension Father      Cancer Father      Cerebrovascular Disease Father         Post bypass surgery     Prostate Cancer Father      Cardiovascular Father      Lipids Father      Hypertension Mother      Alzheimer Disease Mother         dementia     Hyperlipidemia Mother      Unknown/Adopted Mother         Vascular Dementia     Diabetes Paternal Grandmother      Genitourinary Problems Maternal Grandmother         kidney     Cancer Sister 47        breast     Breast Cancer Sister      Breast Cancer Sister      Asthma Son         mild     Breast Cancer Sister      C.A.D. No family hx of      Cancer - colorectal No family hx of      Alcohol/Drug No family hx of      Allergies No family hx of      Anesthesia Reaction No family hx of      Arthritis No family hx of      Blood Disease No family hx of      Circulatory No family hx of      Congenital Anomalies No family hx of      Connective Tissue Disorder No family hx of      Depression No family hx of      Eye Disorder No family hx of      Genetic Disorder No family hx of      Gastrointestinal Disease No family hx of      Gynecology No family hx of       Musculoskeletal Disorder No family hx of      Neurologic Disorder No family hx of      Obesity No family hx of      Osteoporosis No family hx of      Psychotic Disorder No family hx of      Respiratory No family hx of      Thyroid Disease No family hx of      Hearing Loss No family hx of          Current Outpatient Medications   Medication Sig Dispense Refill     CIALIS 10 MG OR TABS ONE TABLET, TAKEN AT LEAST 30 MINUTES BEFORE INTERCOURSE 15 1 YEAR     Ibuprofen (ADVIL PO) Take 400 mg by mouth as needed       lisinopril-hydrochlorothiazide (PRINZIDE/ZESTORETIC) 20-12.5 MG per tablet TAKE 1 TABLET BY MOUTH DAILY 30 tablet 10     simvastatin (ZOCOR) 20 MG tablet TAKE 1 TABLET BY MOUTH EVERY NIGHT AT BEDTIME 90 tablet 2     Allergies   Allergen Reactions     No Known Drug Allergies      BP Readings from Last 3 Encounters:   05/20/19 120/82   02/14/19 155/87   07/18/18 110/78    Wt Readings from Last 3 Encounters:   05/20/19 118 kg (260 lb 3.2 oz)   02/14/19 115.2 kg (254 lb)   07/18/18 104.2 kg (229 lb 11.2 oz)                    Reviewed and updated as needed this visit by Provider  Tobacco  Allergies  Meds  Problems  Med Hx  Surg Hx  Fam Hx         Review of Systems   ROS COMP: Constitutional, HEENT, cardiovascular, pulmonary, gi and gu systems are negative, except as otherwise noted.      Objective    /82   Pulse 52   Temp 98.5  F (36.9  C) (Temporal)   Resp 16   Wt 118 kg (260 lb 3.2 oz)   BMI 36.29 kg/m    Body mass index is 36.29 kg/m .  Physical Exam   GENERAL: healthy, alert and no distress  MS: Left great toe:  normal range of motion, no edema and peripheral pulses normal  SKIN: no suspicious lesions or rashes  NEURO: Normal strength and tone, mentation intact and speech normal  PSYCH: mentation appears normal, affect normal/bright    Diagnostic Test Results:  Labs reviewed in Epic  none         Assessment & Plan     1. Acute idiopathic gout of left foot  - Resolving.  As for concerns if  "lisinopril and hydrochlorothiazide were contributors to his recent acute gout flareup of left great toe, I discussed that he has only been on a thiazide for less than 1 year, and it is a low dose (12.5 mg).  We further discussed that his blood pressure is well controlled and stable.  We discussed purine rich foods to avoid, ensuring adequate water and hydration intake, and decreasing alcohol.  We further discussed that if he were to have a future recurrent episode of gout, we could revisit blood pressure medication and or acute gout medication at that time.  He verbalized understanding and agrees with the plan of care.  I answered all his questions to the best of my ability.    2. Class 2 obesity without serious comorbidity with body mass index (BMI) of 36.0 to 36.9 in adult, unspecified obesity type  - Discussed healthy diet and exercise.  He states he is getting back into the gym and is motivated to get healthier.       BMI:   Estimated body mass index is 36.29 kg/m  as calculated from the following:    Height as of 2/14/19: 1.803 m (5' 11\").    Weight as of this encounter: 118 kg (260 lb 3.2 oz).   Weight management plan: Discussed healthy diet and exercise guidelines        Patient Instructions   Continue your blood pressure regimen.  See your Dr. Wall in July for routine physical.    Christine Perez CNP        Return in about 2 months (around 7/20/2019) for Physical Exam, Lab Work.    Christine Perez CNP  [facility]    "

## 2019-05-23 ENCOUNTER — MYC MEDICAL ADVICE (OUTPATIENT)
Dept: FAMILY MEDICINE | Facility: CLINIC | Age: 59
End: 2019-05-23

## 2019-05-23 DIAGNOSIS — I10 HYPERTENSION GOAL BP (BLOOD PRESSURE) < 140/90: Primary | Chronic | ICD-10-CM

## 2019-05-23 RX ORDER — LISINOPRIL 30 MG/1
30 TABLET ORAL DAILY
Qty: 30 TABLET | Refills: 1 | Status: SHIPPED | OUTPATIENT
Start: 2019-05-23 | End: 2019-07-12

## 2019-07-12 DIAGNOSIS — I10 HYPERTENSION GOAL BP (BLOOD PRESSURE) < 140/90: Chronic | ICD-10-CM

## 2019-07-12 RX ORDER — LISINOPRIL 30 MG/1
30 TABLET ORAL DAILY
Qty: 30 TABLET | Refills: 0 | Status: SHIPPED | OUTPATIENT
Start: 2019-07-12 | End: 2019-08-07

## 2019-07-12 NOTE — TELEPHONE ENCOUNTER
Lisinopril  Medication is being filled for 1 time refill only due to:  Patient needs to be seen because routine PE.    Next 5 appointments (look out 90 days)    Aug 07, 2019  9:00 AM CDT  [facility]  [facility] [address], [address]  [facility] [address]  [phone]        Suzie Parker RN, BSN

## 2019-07-31 NOTE — PROGRESS NOTES
"Subjective     Henrry Cortes is a 58 year old male who presents to clinic today for the following health issues:    HPI   {SUPERLIST (Optional):926746}  {additonal problems for provider to add (Optional):901543}    {HIST REVIEW/ LINKS 2 (Optional):790445}    Reviewed and updated as needed this visit by Provider         Review of Systems   {ROS COMP (Optional):124949}      Objective    There were no vitals taken for this visit.  There is no height or weight on file to calculate BMI.  Physical Exam   {Exam List (Optional):737218}    {Diagnostic Test Results (Optional):069510::\"Diagnostic Test Results:\",\"Labs reviewed in Epic\"}        {PROVIDER CHARTING PREFERENCE:572278}    "

## 2019-08-05 ASSESSMENT — ENCOUNTER SYMPTOMS
FREQUENCY: 0
DIZZINESS: 0
HEMATURIA: 0
COUGH: 0
EYE PAIN: 0
CONSTIPATION: 0
ARTHRALGIAS: 0
FEVER: 0
CHILLS: 0
HEARTBURN: 0
NAUSEA: 0
SORE THROAT: 0
WEAKNESS: 0
DYSURIA: 0
MYALGIAS: 0
PALPITATIONS: 0
HEMATOCHEZIA: 0
SHORTNESS OF BREATH: 0
PARESTHESIAS: 0
NERVOUS/ANXIOUS: 0
HEADACHES: 0
DIARRHEA: 0
JOINT SWELLING: 0
ABDOMINAL PAIN: 0

## 2019-08-07 ENCOUNTER — OFFICE VISIT (OUTPATIENT)
Dept: FAMILY MEDICINE | Facility: CLINIC | Age: 59
End: 2019-08-07
Payer: COMMERCIAL

## 2019-08-07 VITALS
OXYGEN SATURATION: 98 % | WEIGHT: 232 LBS | HEIGHT: 70 IN | TEMPERATURE: 96.4 F | DIASTOLIC BLOOD PRESSURE: 82 MMHG | RESPIRATION RATE: 16 BRPM | HEART RATE: 62 BPM | SYSTOLIC BLOOD PRESSURE: 128 MMHG | BODY MASS INDEX: 33.21 KG/M2

## 2019-08-07 DIAGNOSIS — K42.9 UMBILICAL HERNIA WITHOUT OBSTRUCTION AND WITHOUT GANGRENE: ICD-10-CM

## 2019-08-07 DIAGNOSIS — E78.5 HYPERLIPIDEMIA LDL GOAL <130: ICD-10-CM

## 2019-08-07 DIAGNOSIS — Z12.5 SCREENING FOR PROSTATE CANCER: ICD-10-CM

## 2019-08-07 DIAGNOSIS — G56.03 BILATERAL CARPAL TUNNEL SYNDROME: ICD-10-CM

## 2019-08-07 DIAGNOSIS — I10 HYPERTENSION GOAL BP (BLOOD PRESSURE) < 140/90: Chronic | ICD-10-CM

## 2019-08-07 DIAGNOSIS — Z00.01 ENCOUNTER FOR ROUTINE ADULT MEDICAL EXAM WITH ABNORMAL FINDINGS: Primary | ICD-10-CM

## 2019-08-07 DIAGNOSIS — Z13.1 SCREENING FOR DIABETES MELLITUS: ICD-10-CM

## 2019-08-07 PROBLEM — E66.01 MORBID OBESITY DUE TO EXCESS CALORIES (H): Chronic | Status: RESOLVED | Noted: 2017-06-09 | Resolved: 2019-08-07

## 2019-08-07 LAB
ALBUMIN SERPL-MCNC: 4.3 G/DL (ref 3.4–5)
ALP SERPL-CCNC: 55 U/L (ref 40–150)
ALT SERPL W P-5'-P-CCNC: 30 U/L (ref 0–70)
ANION GAP SERPL CALCULATED.3IONS-SCNC: 7 MMOL/L (ref 3–14)
AST SERPL W P-5'-P-CCNC: 22 U/L (ref 0–45)
BILIRUB SERPL-MCNC: 0.5 MG/DL (ref 0.2–1.3)
BUN SERPL-MCNC: 22 MG/DL (ref 7–30)
CALCIUM SERPL-MCNC: 9.9 MG/DL (ref 8.5–10.1)
CHLORIDE SERPL-SCNC: 104 MMOL/L (ref 94–109)
CHOLEST SERPL-MCNC: 211 MG/DL
CO2 SERPL-SCNC: 28 MMOL/L (ref 20–32)
CREAT SERPL-MCNC: 1.08 MG/DL (ref 0.66–1.25)
GFR SERPL CREATININE-BSD FRML MDRD: 76 ML/MIN/{1.73_M2}
GLUCOSE SERPL-MCNC: 109 MG/DL (ref 70–99)
HDLC SERPL-MCNC: 95 MG/DL
LDLC SERPL CALC-MCNC: 99 MG/DL
NONHDLC SERPL-MCNC: 116 MG/DL
POTASSIUM SERPL-SCNC: 4.5 MMOL/L (ref 3.4–5.3)
PROT SERPL-MCNC: 7.8 G/DL (ref 6.8–8.8)
PSA SERPL-ACNC: 0.38 UG/L (ref 0–4)
SODIUM SERPL-SCNC: 139 MMOL/L (ref 133–144)
TRIGL SERPL-MCNC: 83 MG/DL

## 2019-08-07 PROCEDURE — 99396 PREV VISIT EST AGE 40-64: CPT | Performed by: FAMILY MEDICINE

## 2019-08-07 PROCEDURE — 80061 LIPID PANEL: CPT | Performed by: FAMILY MEDICINE

## 2019-08-07 PROCEDURE — 80053 COMPREHEN METABOLIC PANEL: CPT | Performed by: FAMILY MEDICINE

## 2019-08-07 PROCEDURE — 36415 COLL VENOUS BLD VENIPUNCTURE: CPT | Performed by: FAMILY MEDICINE

## 2019-08-07 PROCEDURE — 99214 OFFICE O/P EST MOD 30 MIN: CPT | Mod: 25 | Performed by: FAMILY MEDICINE

## 2019-08-07 PROCEDURE — G0103 PSA SCREENING: HCPCS | Performed by: FAMILY MEDICINE

## 2019-08-07 RX ORDER — LISINOPRIL AND HYDROCHLOROTHIAZIDE 12.5; 2 MG/1; MG/1
TABLET ORAL
Refills: 10 | COMMUNITY
Start: 2019-05-10 | End: 2019-08-07

## 2019-08-07 RX ORDER — FAMOTIDINE 20 MG
TABLET ORAL
COMMUNITY
End: 2022-04-23

## 2019-08-07 RX ORDER — LISINOPRIL 30 MG/1
TABLET ORAL
Refills: 0 | COMMUNITY
Start: 2019-07-15 | End: 2019-08-27

## 2019-08-07 RX ORDER — CHLORAL HYDRATE 500 MG
CAPSULE ORAL
COMMUNITY
Start: 2018-02-05

## 2019-08-07 ASSESSMENT — ENCOUNTER SYMPTOMS
COUGH: 0
WEAKNESS: 0
ARTHRALGIAS: 0
DIZZINESS: 0
CHILLS: 0
MYALGIAS: 0
HEMATURIA: 0
CONSTIPATION: 0
NAUSEA: 0
HEADACHES: 0
NERVOUS/ANXIOUS: 0
PARESTHESIAS: 0
FEVER: 0
EYE PAIN: 0
DIARRHEA: 0
SHORTNESS OF BREATH: 0
HEARTBURN: 0
ABDOMINAL PAIN: 0
DYSURIA: 0
HEMATOCHEZIA: 0
PALPITATIONS: 0
FREQUENCY: 0
SORE THROAT: 0
JOINT SWELLING: 0

## 2019-08-07 ASSESSMENT — PAIN SCALES - GENERAL: PAINLEVEL: NO PAIN (0)

## 2019-08-07 ASSESSMENT — MIFFLIN-ST. JEOR: SCORE: 1888.6

## 2019-08-07 NOTE — PROGRESS NOTES
"SUBJECTIVE:   CC: Henrry Cortes is an 58 year old male who presents for preventative health visit.     Healthy Habits:     Getting at least 3 servings of Calcium per day:  Yes    Bi-annual eye exam:  Yes    Dental care twice a year:  Yes    Sleep apnea or symptoms of sleep apnea:  Excessive snoring and Sleep apnea    Diet:  Regular (no restrictions), Carbohydrate counting, Vegetarian/vegan and Gluten-free/reduced    Frequency of exercise:  6-7 days/week    Duration of exercise:  45-60 minutes    Taking medications regularly:  Yes    Medication side effects:  None    PHQ-2 Total Score: 0    Additional concerns today:  No    Umbilical Hernia  He mentions that he had a umbilical hernia repair and had recurrence. It \"pops out occasionally\" when exercising. He has not had any pain or other concerns. Would like it checked.      Carpal Tunnel Syndrome Bilateral  He has been using braces for his carpal tunnel syndrome over the past few weeks as symptoms of numbness flared up recently. Right hand is more painful than the left, but he is getting some benefit with these braces. He reports that he noted this flare up after using a chain saw.     Hypertension   Ric reports that he's had no muscle cramps or gout flares since his last change in medication. Currently he is taking 30mg Lisinopril without the hydrochlorothiazide.     BP Readings from Last 6 Encounters:   08/07/19 128/82   05/20/19 120/82   02/14/19 155/87   07/18/18 110/78   06/09/17 118/76   06/05/17 (!) 166/102     Exercise, Weight Loss  He had a total hip arthroplasty recently began running short distances without any complications. He plans to take a posture of observation with the hip joints. He has been exercising regularly at a gym and participating in classes 5 days a week on average. He has lost nearly 30 lbs in less than 3 months. His personal goal is to reach 200 lbs by the end of 2019.     Wt Readings from Last 5 Encounters:   08/07/19 105.2 kg (232 lb) "   19 118 kg (260 lb 3.2 oz)   19 115.2 kg (254 lb)   18 104.2 kg (229 lb 11.2 oz)   17 116.1 kg (256 lb)     Today's PHQ-2 Score:   PHQ-2 (  [employer]) 2019   Q1: Little interest or pleasure in doing things 0   Q2: Feeling down, depressed or hopeless 0   PHQ-2 Score 0   Q1: Little interest or pleasure in doing things Not at all   Q2: Feeling down, depressed or hopeless Not at all   PHQ-2 Score 0     Abuse: Current or Past(Physical, Sexual or Emotional)- No  Do you feel safe in your environment? Yes    Social History     Tobacco Use     Smoking status: Former Smoker     Packs/day: 0.50     Types: Cigarettes     Last attempt to quit: 10/20/2010     Years since quittin.8     Smokeless tobacco: Never Used   Substance Use Topics     Alcohol use: Yes     Alcohol/week: 0.5 oz     Comment: occasionally     If you drink alcohol do you typically have >3 drinks per day or >7 drinks per week? No    Alcohol Use 2019   Prescreen: >3 drinks/day or >7 drinks/week? No   Prescreen: >3 drinks/day or >7 drinks/week? -     Last PSA:   PSA   Date Value Ref Range Status   2018 0.31 0 - 4 ug/L Final     Comment:     Assay Method:  Chemiluminescence using Siemens Vista analyzer       Reviewed orders with patient. Reviewed health maintenance and updated orders accordingly - Yes  BP Readings from Last 3 Encounters:   19 128/82   19 120/82   19 155/87    Wt Readings from Last 3 Encounters:   19 105.2 kg (232 lb)   19 118 kg (260 lb 3.2 oz)   19 115.2 kg (254 lb)         Reviewed and updated as needed this visit by clinical staff  Tobacco  Allergies  Meds  Med Hx  Surg Hx  Fam Hx  Soc Hx      Reviewed and updated as needed this visit by Provider        Past Surgical History:   Procedure Laterality Date     BIOPSY      benign cysts on left forearm     COLONOSCOPY  2013    Procedure: COLONOSCOPY;  Colonscopy/Screen for colon cancer  [facility]  "sent 8/14/13 [facility];  Surgeon: Annie Wall MD;  Location: [facility] OR     HERNIA REPAIR, UMBILICAL  08/06/2004    with mesh     LASIK  2003,2004    had revisions x 2     ORTHOPEDIC SURGERY  6/9/2016    Right Hip replacement     REMOVAL OF SPERM DUCT(S)  2000       Review of Systems   Constitutional: Negative for chills and fever.   HENT: Negative for congestion, ear pain, hearing loss and sore throat.    Eyes: Negative for pain and visual disturbance.   Respiratory: Negative for cough and shortness of breath.    Cardiovascular: Negative for chest pain, palpitations and peripheral edema.   Gastrointestinal: Negative for abdominal pain, constipation, diarrhea, heartburn, hematochezia and nausea.   Genitourinary: Negative for discharge, dysuria, frequency, genital sores, hematuria, impotence and urgency.   Musculoskeletal: Negative for arthralgias, joint swelling and myalgias.   Skin: Negative for rash.   Neurological: Negative for dizziness, weakness, headaches and paresthesias.   Psychiatric/Behavioral: Negative for mood changes. The patient is not nervous/anxious.      This document serves as a record of the services and decisions personally performed and made by Annie Wall MD. It was created on his behalf by Renny Macedo, a trained medical scribe. The creation of this document is based on the provider's statements to the medical scribe.  Renny Macedo 9:24 AM August 7, 2019    OBJECTIVE:   /82   Pulse 62   Temp 96.4  F (35.8  C)   Resp 16   Ht 1.778 m (5' 10\")   Wt 105.2 kg (232 lb)   SpO2 98%   BMI 33.29 kg/m      Physical Exam  GENERAL: healthy, alert and no distress  EYES: Eyes grossly normal to inspection, PERRL and conjunctivae and sclerae normal  HENT: ear canals and TM's normal, nose and mouth without ulcers or lesions  NECK: no adenopathy, no asymmetry, masses, or scars and thyroid normal to palpation  RESP: lungs clear to auscultation - no rales, rhonchi or wheezes  CV: regular rate and " rhythm, normal S1 S2, no S3 or S4, no murmur, click or rub, no peripheral edema and peripheral pulses strong  (MALE): normal testicular exam no masses  RECTAL: No masses, prostate normal   ABDOMEN: soft, nontender, no hepatosplenomegaly, no masses and bowel sounds normal. Small umbilical hernia noted. Easily reducible. No discomfort.   MS: no gross musculoskeletal defects noted, no edema, normal Phalen's and Tinel's signs, strength normal with the wrists  SKIN: no suspicious lesions or rashes to visible skin   NEURO: Normal strength and tone, mentation intact and speech normal  PSYCH: mentation appears normal, affect normal/bright    Diagnostic Test Results:  Labs reviewed in Epic  No results found for this or any previous visit (from the past 24 hour(s)).    ASSESSMENT/PLAN:   1. Encounter for routine adult medical exam with abnormal findings  See counseling messages         2. Bilateral carpal tunnel syndrome  Has been wearing braces for this with some relief of symptoms   Continue with use of braces and recheck if does not resolve.   All questions invited, asked and answered to the patient's apparent satisfaction.  Patient agrees to plan.      3. Umbilical hernia without obstruction and without gangrene  Discussed signs of incarceration and that this is an emergent situation requiring immediate evaluation.   Dicussed repeat repair.   He declines at this time.   Will call back as needed.     4. Hypertension goal BP (blood pressure) < 140/90  Well controlled with current treatment. Recently changed to Lisinopril 30mg without hydrochlorothiazide and has no recurrence of side effects reported.    5. Hyperlipidemia LDL goal <130  Awaiting results. Dose adjustment as needed.    - Lipid panel reflex to direct LDL Fasting    6. Screening for diabetes mellitus  Will notify of results.   - Comprehensive metabolic panel (BMP + Alb, Alk Phos, ALT, AST, Total. Bili, TP)    7. Screening for prostate cancer  Will notify of  "results.   - PSA, screen    COUNSELING:   Reviewed preventive health counseling, as reflected in patient instructions  Special attention given to:        Regular exercise       Healthy diet/nutrition       Prostate cancer screening    Estimated body mass index is 33.29 kg/m  as calculated from the following:    Height as of this encounter: 1.778 m (5' 10\").    Weight as of this encounter: 105.2 kg (232 lb).     Weight management plan: Discussed healthy diet and exercise guidelines patient is already has a regular exercise regimen and has lost 28 lbs in <3 months     reports that he quit smoking about 8 years ago. His smoking use included cigarettes. He smoked 0.50 packs per day. He has never used smokeless tobacco.    Counseling Resources:  ATP IV Guidelines  Pooled Cohorts Equation Calculator  FRAX Risk Assessment  ICSI Preventive Guidelines  Dietary Guidelines for Americans, 2010  USDA's MyPlate  ASA Prophylaxis  Lung CA Screening    The information in this document, created by the medical scribe for me, accurately reflects the services I personally performed and the decisions made by me. I have reviewed and approved this document for accuracy prior to leaving the patient care area.  August 7, 2019 9:26 AM    TRENT PERES MD, MD  [facility] [place]  "

## 2019-08-19 ENCOUNTER — TRANSFERRED RECORDS (OUTPATIENT)
Dept: HEALTH INFORMATION MANAGEMENT | Facility: CLINIC | Age: 59
End: 2019-08-19

## 2019-08-23 DIAGNOSIS — I10 HYPERTENSION GOAL BP (BLOOD PRESSURE) < 140/90: Primary | Chronic | ICD-10-CM

## 2019-08-27 RX ORDER — LISINOPRIL 30 MG/1
TABLET ORAL
Qty: 90 TABLET | Refills: 1 | Status: SHIPPED | OUTPATIENT
Start: 2019-08-27 | End: 2020-02-24

## 2019-08-30 DIAGNOSIS — E78.5 HYPERLIPIDEMIA LDL GOAL <130: ICD-10-CM

## 2019-08-30 RX ORDER — SIMVASTATIN 20 MG
20 TABLET ORAL AT BEDTIME
Qty: 90 TABLET | Refills: 3 | Status: SHIPPED | OUTPATIENT
Start: 2019-08-30 | End: 2020-10-07

## 2019-08-30 NOTE — TELEPHONE ENCOUNTER
Zocor  Routing refill request to provider for review/approval because:  A break in medication    Suzie Parker, RN, BSN

## 2019-09-27 ENCOUNTER — HEALTH MAINTENANCE LETTER (OUTPATIENT)
Age: 59
End: 2019-09-27

## 2019-10-01 ENCOUNTER — MYC MEDICAL ADVICE (OUTPATIENT)
Dept: FAMILY MEDICINE | Facility: CLINIC | Age: 59
End: 2019-10-01

## 2019-10-02 ENCOUNTER — TRANSFERRED RECORDS (OUTPATIENT)
Dept: HEALTH INFORMATION MANAGEMENT | Facility: CLINIC | Age: 59
End: 2019-10-02

## 2019-10-03 ENCOUNTER — OFFICE VISIT (OUTPATIENT)
Dept: FAMILY MEDICINE | Facility: CLINIC | Age: 59
End: 2019-10-03
Payer: COMMERCIAL

## 2019-10-03 VITALS
SYSTOLIC BLOOD PRESSURE: 116 MMHG | DIASTOLIC BLOOD PRESSURE: 80 MMHG | OXYGEN SATURATION: 95 % | WEIGHT: 244.2 LBS | TEMPERATURE: 97.8 F | RESPIRATION RATE: 18 BRPM | BODY MASS INDEX: 34.19 KG/M2 | HEIGHT: 71 IN | HEART RATE: 64 BPM

## 2019-10-03 DIAGNOSIS — G56.01 CARPAL TUNNEL SYNDROME OF RIGHT WRIST: ICD-10-CM

## 2019-10-03 DIAGNOSIS — G47.33 OSA (OBSTRUCTIVE SLEEP APNEA): ICD-10-CM

## 2019-10-03 DIAGNOSIS — Z01.818 PREOP GENERAL PHYSICAL EXAM: Primary | ICD-10-CM

## 2019-10-03 DIAGNOSIS — Z23 NEED FOR VACCINATION: ICD-10-CM

## 2019-10-03 DIAGNOSIS — I10 ESSENTIAL HYPERTENSION, BENIGN: ICD-10-CM

## 2019-10-03 PROCEDURE — 90471 IMMUNIZATION ADMIN: CPT | Performed by: FAMILY MEDICINE

## 2019-10-03 PROCEDURE — 99215 OFFICE O/P EST HI 40 MIN: CPT | Mod: 25 | Performed by: FAMILY MEDICINE

## 2019-10-03 PROCEDURE — 90750 HZV VACC RECOMBINANT IM: CPT | Performed by: FAMILY MEDICINE

## 2019-10-03 ASSESSMENT — PAIN SCALES - GENERAL: PAINLEVEL: NO PAIN (0)

## 2019-10-03 ASSESSMENT — MIFFLIN-ST. JEOR: SCORE: 1951.87

## 2019-10-03 NOTE — PATIENT INSTRUCTIONS
Important Takeaway Points From This Visit:    You are good to go for surgery. This is good for 30 days.    If you notice you are getting sick close to the time of surgery, call ahead to try and reschedule if needed.    Stop your fish oil 1 week before surgery as advised by your surgeon.    Do not take NSAID medications 3 days before surgery.    Hold your lisinopril on the day of surgery.    You got your shingles vaccine today. You will need another booster in ~6 months.      As always, please call with any questions or concerns. I look forward to seeing you again soon!    Take care,  Dr. Turner    Your current medication list is printed. Please keep this with you - it is helpful to bring this current list to any other medical appointments. It can also be helpful if you ever go to the emergency room or hospital.    If you had lab testing today we will call you with the results. The phone number we will call with your results is # [phone] (home) [phone] (work). If this is not the best number please call our clinic and change the number.    If you need any refills, please call your pharmacy and they will contact us.    If you have any further concerns or wish to schedule another appointment, please call our office at [phone].    If you have a medical emergency, please call 157.    Thank you for coming to [facility]!

## 2019-10-03 NOTE — PROGRESS NOTES
[facility] [place]  [address], [address]  [place] [address]  Phone: [phone]  Fax: [phone]    PRE-OP EVALUATION:    Today's date: 10/3/2019    Henrry Cortes (: 1960) presents for pre-operative evaluation assessment as requested by Dr. Danilo Mccarty.  He requires evaluation and anesthesia risk assessment prior to undergoing surgery/procedure for treatment of Right sided Carpal Tunnel syndrome .      Date of Surgery/ Procedure: 10/14/2019  Time of Surgery/ Procedure: Unknown  Hospital/Surgical [facility]: [facility]  Fax number: [phone]  Primary Physician: Annie Wall  Type of Anesthesia Anticipated: General  History of anesthesia complications: NONE  History of  abnormal bleeding: NONE   History of blood transfusions: NO  Patient has a Health Care Directive or Living Will:  NO    ====================  1- NO - Do you ever have any pain or discomfort in your chest?  2- NO - Have you ever had a severe pain across the front of your chest lasting for half an hour or more?  3- NO - Do you have swelling in your feet or ankles at times?  4- NO - Are you troubled by shortness of breath when: walking on the level/ up a slight hill/ at night?  5- NO - Does your chest ever sound wheezy or whistling?  6- NO - Do you currently have a cold, bronchitis or other respiratory infection?  7- NO - Have you had a cold, bronchitis or other respiratory infection within the last 2 weeks?  8- NO - Do you usually have a cough?  9- NO - Do you sometimes get pains in the calves of your legs when you walk?  10-NO - Do you or anyone in your family have previous history of blood clots?  11-YES - Do you or does anyone in your family have serious bleeding problem such as prolonged bleeding following surgeries or cuts? (Father was on blood thinners due to heart valve.)  12-NO - Have you ever had problems with anemia or been told to take iron pills?  13-NO - Have you had any abnormal blood  "loss such as black, tarry or bloody stools, or abnormal vaginal bleeding?  14-NO - Have you or any of your relatives ever had problems with anesthesia?  15-YES - Do you snore or stop breathing at night? (known sleep apnea, uses cpap)  16-NO - Do you have any prosthetic heart valves or joints?  17-NO - Is there any chance that you may be pregnant?    HPI:    See problem list for active medical problems.  Problems all longstanding and stable, except as noted/documented.  See ROS for pertinent symptoms related to these conditions.    Patient Active Problem List    Diagnosis Date Noted     Umbilical hernia without obstruction and without gangrene 06/09/2017     Priority: Medium     Family history of bicuspid cardiac valve 08/11/2016     Priority: Medium     Family history of disease of aorta 08/11/2016     Priority: Medium     DELANEY (obstructive sleep apnea)-Mild (AHI 8) 06/21/2012     Priority: Medium     Indications for Polysomnogram 6/19/2012: The patient is a 51 y year old Male who is 5' 11\" and weighs 214.0 lbs.  His BMI equals 30.0, [place] sleepiness scale 11.0 and neck size is 17\".  A full night polysomnogram was performed to evaluate for snoring and sleepiness.    Polysomnogram Data:  A full night polysomnogram recorded the standard physiologic parameters including EEG, EOG, EMG, EKG, nasal and oral airflow.  Respiratory parameters of chest and abdominal movements were recorded with respiratory inductance plethysmography.  Oxygen saturation was recorded by pulse oximetry.      Sleep Architecture:  The total recording time of the polysomnogram was 416.2 minutes.  The total sleep time was 382.0 minutes.  Sleep latency was 3.8 minutes.  REM latency was 38.5 minutes.  Arousal Index was 27.3.  Sleep Efficiency was 91.8%.  Wake after sleep onset was 31.0.  The patient spent 3.1% of total sleep time in Stage N1, 49.0% in Stage N2, 16.6% in Stages N3, and 31.3% in REM.       Respiration:      Sustained Sleep Associated " Hypoventilation -was not monitored.    Sleep Associated Hypoxemia - was present.  Baseline oxygen saturation was 94.7%.  Snoring was reported as loud.  Lowest oxygen saturation was 87.0%    Events - The polysomnogram revealed a presence of 12 obstructive, no central, and no mixed apneas resulting in an Apnea index of 1.9 events per hour.  There were 40 hypopneas resulting in a Hypopnea index of 6.3 events per hour.  The combined Apnea/Hypopnea Index was 8.2 events per hour.  The REM AHI is 4.0.  The supine AHI is 18.8.  The RERA index was 10.4 per hour.   The RDI was  .    Movement Activity:      Limb Notes - There were 265 limb movements recorded.  Of this total, 223 were classified as PLMs.  Of the PLMs, 7 were associated with arousals.  The Limb Movement index was 41.6 per hour while the PLM index was 35.0per hour.    Behavior - none    Bruxism - none    Seizures - none    Cardiac Summary:   The average pulse rate was 49.6 bpm.  The minimum pulse rate was 38.9 bpm while the maximum pulse rate was 80.5 bpm.    Assessment:     Mild DELANEY (AHI 8)    Recommendations:    Options include a dental appliance if dentition allows, auto-titrating CPAP, ENT evaluation for surgery in carefully selected patients. Clinical correlation advised.     Advise regarding the risks of drowsy driving    Suggest optimizing sleep hygiene and avoiding sleep deprivation    Weight management    Dopaminergic therapy should be used for management of restless legs syndrome and not based on the presence of periodic limb movements alone       Impaired fasting glucose 03/06/2012     Priority: Medium     Snoring 03/06/2012     Priority: Medium     Plantar fasciitis 03/06/2012     Priority: Medium     Hypertension goal BP (blood pressure) < 140/90 03/06/2012     Priority: Medium     HYPERLIPIDEMIA LDL GOAL <130 10/31/2010     Priority: Medium      Past Medical History:   Diagnosis Date     Essential hypertension, benign      Lateral epicondylitis       "Mixed hyperlipidemia      Obesity, unspecified 2006    lost 50#     Sleep apnea     has CPAP     Past Surgical History:   Procedure Laterality Date     BIOPSY  2014    benign cysts on left forearm     COLONOSCOPY  2013    Procedure: COLONOSCOPY;  Colonscopy/Screen for colon cancer  [facility]  [facility] sent [facility];  Surgeon: Annie Wall MD;  Location: [facility] OR     HERNIA REPAIR, UMBILICAL  2004    with mesh     LASIK  ,    had revisions x 2     ORTHOPEDIC SURGERY  2016    Right Hip replacement     REMOVAL OF SPERM DUCT(S)       Current Outpatient Medications   Medication Sig Dispense Refill     fish oil-omega-3 fatty acids 1000 MG capsule        Ibuprofen (ADVIL PO) Take 400 mg by mouth as needed       lisinopril (PRINIVIL/ZESTRIL) 30 MG tablet TK ONE T PO D 90 tablet 1     magnesium citrate solution Take 296 mLs by mouth once       simvastatin (ZOCOR) 20 MG tablet Take 1 tablet (20 mg) by mouth At Bedtime 90 tablet 3     Vitamin D, Cholecalciferol, 1000 units CAPS        OTC products: Glutamine, fiber, and protein supplement    Allergies   Allergen Reactions     No Known Drug Allergies       Latex Allergy: NO    Social History     Tobacco Use     Smoking status: Former Smoker     Packs/day: 0.50     Years: 20.00     Pack years: 10.00     Types: Cigarettes     Last attempt to quit: 10/20/2010     Years since quittin.9     Smokeless tobacco: Never Used   Substance Use Topics     Alcohol use: Yes     Alcohol/week: 0.8 standard drinks     Comment: occasionally 1-2 drinks per week     History   Drug Use No     REVIEW OF SYSTEMS:   Constitutional, HEENT, cardiovascular, pulmonary, GI, , musculoskeletal, neuro, skin, endocrine and psych systems are negative, except as otherwise noted.    EXAM:   /80   Pulse 64   Temp 97.8  F (36.6  C) (Temporal)   Resp 18   Ht 1.791 m (5' 10.5\")   Wt 110.8 kg (244 lb 3.2 oz)   SpO2 95%   BMI 34.54 kg/m      GENERAL APPEARANCE: " healthy, alert and no distress     EYES: EOMI,  PERRL     HENT: ear canals and TM's normal and nose and mouth without ulcers or lesions     NECK: no adenopathy, no asymmetry, masses, or scars and thyroid normal to palpation     RESP: lungs clear to auscultation - no rales, rhonchi or wheezes     CV: regular rates and rhythm, normal S1 S2, no S3 or S4 and no murmur, click or rub     ABDOMEN:  soft, nontender, no HSM or masses and bowel sounds normal     MS: extremities normal- no gross deformities noted, no evidence of inflammation in joints, FROM in all extremities.     SKIN: no suspicious lesions or rashes over exposed surfaces     NEURO: Normal strength and tone, gait normal, mentation intact and speech normal     PSYCH: mentation appears normal. and affect normal/bright     LYMPHATICS: No cervical adenopathy    DIAGNOSTICS:    Preop Testing   No labs or EKG required for intermediate risk surgery (cataract, skin procedure, breast biopsy, etc). Recent BMP done on 8/7/19 listed below.  EKG: Not indicated due to non-vascular surgery and low risk of event (age <65 and without cardiac risk factors)    Results for ARLEY PRIETO (MRN [id number]) as of 10/3/2019 08:36   Ref. Range 8/7/2019 09:47   Sodium Latest Ref Range: 133 - 144 mmol/L 139   Potassium Latest Ref Range: 3.4 - 5.3 mmol/L 4.5   Chloride Latest Ref Range: 94 - 109 mmol/L 104   Carbon Dioxide Latest Ref Range: 20 - 32 mmol/L 28   Urea Nitrogen Latest Ref Range: 7 - 30 mg/dL 22   Creatinine Latest Ref Range: 0.66 - 1.25 mg/dL 1.08   GFR Estimate Latest Ref Range: >60 mL/min/1.73_m2 76   GFR Estimate If Black Latest Ref Range: >60 mL/min/1.73_m2 88   Calcium Latest Ref Range: 8.5 - 10.1 mg/dL 9.9   Anion Gap Latest Ref Range: 3 - 14 mmol/L 7   Albumin Latest Ref Range: 3.4 - 5.0 g/dL 4.3   Protein Total Latest Ref Range: 6.8 - 8.8 g/dL 7.8   Bilirubin Total Latest Ref Range: 0.2 - 1.3 mg/dL 0.5   Alkaline Phosphatase Latest Ref Range: 40 - 150 U/L 55   ALT  Latest Ref Range: 0 - 70 U/L 30   AST Latest Ref Range: 0 - 45 U/L 22   Cholesterol Latest Ref Range: <200 mg/dL 211 (H)   HDL Cholesterol Latest Ref Range: >39 mg/dL 95   LDL Cholesterol Calculated Latest Ref Range: <100 mg/dL 99   Non HDL Cholesterol Latest Ref Range: <130 mg/dL 116   PSA Latest Ref Range: 0 - 4 ug/L 0.38   Triglycerides Latest Ref Range: <150 mg/dL 83   Glucose Latest Ref Range: 70 - 99 mg/dL 109 (H)       IMPRESSION:   Reason for surgery/procedure: Treatment of right sided carpal tunnel  Diagnosis/reason for consult: Carpal tunnel syndrome, pre-operative exam. History of hypertension and sleep apnea.    The proposed surgical procedure is considered INTERMEDIATE risk.    For above listed surgery and anesthesia:   Patient is at LOW risk for surgery/procedure and perioperative/procedure complications.    Complications include well controlled hypertension and sleep apnea.    RECOMMENDATIONS:     --Approval given to proceed with proposed procedure, without further diagnostic evaluation.    --Recommendation(s):  --Discontinue NSAIDS 3 day(s) prior to procedure to reduce bleeding risk.   --Patient is to hold fish oil one week before surgery.  --Patient should hold lisinopril 1 day before surgery.  -- Patient is to take all other medications the day of surgery    --Recommendations:  Patient has a functional capacity of 4 METs or greater. No family or personal history of cardiac disease.  No further cardiac workup is indicated.    -- If he is to stay overnight he was informed to bring his CPAP device.    Signed Electronically by: MD Paulo Mcgowan MD  [facility]      Copy of this evaluation report is provided to requesting physician.    Preop Guidelines

## 2019-10-03 NOTE — NURSING NOTE
Prior to immunization administration, verified patients identity using patient s name and date of birth. Please see Immunization Activity for additional information.     Screening Questionnaire for Adult Immunization    Are you sick today?   No   Do you have allergies to medications, food, a vaccine component or latex?   No   Have you ever had a serious reaction after receiving a vaccination?   No   Do you have a long-term health problem with heart disease, lung disease, asthma, kidney disease, metabolic disease (e.g. diabetes), anemia, or other blood disorder?   No   Do you have cancer, leukemia, HIV/AIDS, or any other immune system problem?   No   In the past 3 months, have you taken medications that affect  your immune system, such as prednisone, other steroids, or anticancer drugs; drugs for the treatment of rheumatoid arthritis, Crohn s disease, or psoriasis; or have you had radiation treatments?   No   Have you had a seizure, or a brain or other nervous system problem?   No   During the past year, have you received a transfusion of blood or blood     products, or been given immune (gamma) globulin or antiviral drug?   No   For women: Are you pregnant or is there a chance you could become        pregnant during the next month?   No   Have you received any vaccinations in the past 4 weeks?   No     Immunization questionnaire answers were all negative.        Per orders of Dr. Prater, injection of Shingrix given by CHAS Jeffers. Patient instructed to remain in clinic for 15 minutes afterwards, and to report any adverse reaction to me immediately.       Screening performed by oRnit Seymour MA on 10/3/2019 at 8:44 AM.

## 2019-10-28 ENCOUNTER — TRANSFERRED RECORDS (OUTPATIENT)
Dept: HEALTH INFORMATION MANAGEMENT | Facility: CLINIC | Age: 59
End: 2019-10-28

## 2019-10-30 ENCOUNTER — TRANSFERRED RECORDS (OUTPATIENT)
Dept: HEALTH INFORMATION MANAGEMENT | Facility: CLINIC | Age: 59
End: 2019-10-30

## 2019-12-26 NOTE — PROGRESS NOTES
[facility] [place]  [address] [address], [address]  [place] [address]  [phone]  Dept: [phone]    PRE-OP EVALUATION:  Today's date: 2019    Henrry Cortes (: 1960) presents for pre-operative evaluation assessment as requested by Dr. Mccarty.  He requires evaluation and anesthesia risk assessment prior to undergoing surgery/procedure for treatment of carpel tunnel release on left hand .    Fax number for surgical facility: [phone]  Primary Physician: Annie Wall  Type of Anesthesia Anticipated: General    Patient has a Health Care Directive or Living Will:  NO    Preop Questions 2019   Who is doing your surgery? 'Dr. Danilo Mccarty   What are you having done? Carpel Tunnel Release on left hand   Date of Surgery/Procedure: 2020   Facility or Hospital where procedure/surgery will be performed: [facility], [facility]   1.  Do you have a history of Heart attack, stroke, stent, coronary bypass surgery, or other heart surgery? No   2.  Do you ever have any pain or discomfort in your chest? No   3.  Do you have a history of  Heart Failure? No   4.   Are you troubled by shortness of breath when:  walking on a level surface, or up a slight hill, or at night? No   5.  Do you currently have a cold, bronchitis or other respiratory infection? No   6.  Do you have a cough, shortness of breath, or wheezing? No   7.  Do you sometimes get pains in the calves of your legs when you walk? No   8. Do you or anyone in your family have previous history of blood clots? No   9.  Do you or does anyone in your family have a serious bleeding problem such as prolonged bleeding following surgeries or cuts? No   10. Have you ever had problems with anemia or been told to take iron pills? No   11. Have you had any abnormal blood loss such as black, tarry or bloody stools? No   12. Have you ever had a blood transfusion? No   13. Have you or any of your relatives ever  "had problems with anesthesia? No   14. Do you have sleep apnea, excessive snoring or daytime drowsiness? YES - sleep apnea, has CPAP   15. Do you have any prosthetic heart valves? No   16. Do you have prosthetic joints? YES - right hip         HPI:     HPI related to upcoming procedure: patient with some numbness of the left hand especially at night. Had issues with right as well. Previously had surgery on the right.       HYPERTENSION - Patient has longstanding history of HTN , currently denies any symptoms referable to elevated blood pressure. Specifically denies chest pain, palpitations, dyspnea, orthopnea, PND or peripheral edema. Blood pressure readings have been in normal range. Current medication regimen is as listed below. Patient denies any side effects of medication.     SLEEP APNEA - Patient has a history of sleep apnea. He has CPAP but doesn't always use it.       MEDICAL HISTORY:     Patient Active Problem List    Diagnosis Date Noted     Obesity (BMI 35.0-39.9) with comorbidity (H) 12/31/2019     Priority: Medium     Umbilical hernia without obstruction and without gangrene 06/09/2017     Priority: Medium     Family history of bicuspid cardiac valve 08/11/2016     Priority: Medium     Family history of disease of aorta 08/11/2016     Priority: Medium     DELANEY (obstructive sleep apnea)-Mild (AHI 8) 06/21/2012     Priority: Medium     Indications for Polysomnogram 6/19/2012: The patient is a 51 y year old Male who is 5' 11\" and weighs 214.0 lbs.  His BMI equals 30.0, [place] sleepiness scale 11.0 and neck size is 17\".  A full night polysomnogram was performed to evaluate for snoring and sleepiness.    Polysomnogram Data:  A full night polysomnogram recorded the standard physiologic parameters including EEG, EOG, EMG, EKG, nasal and oral airflow.  Respiratory parameters of chest and abdominal movements were recorded with respiratory inductance plethysmography.  Oxygen saturation was recorded by pulse " oximetry.      Sleep Architecture:  The total recording time of the polysomnogram was 416.2 minutes.  The total sleep time was 382.0 minutes.  Sleep latency was 3.8 minutes.  REM latency was 38.5 minutes.  Arousal Index was 27.3.  Sleep Efficiency was 91.8%.  Wake after sleep onset was 31.0.  The patient spent 3.1% of total sleep time in Stage N1, 49.0% in Stage N2, 16.6% in Stages N3, and 31.3% in REM.       Respiration:      Sustained Sleep Associated Hypoventilation -was not monitored.    Sleep Associated Hypoxemia - was present.  Baseline oxygen saturation was 94.7%.  Snoring was reported as loud.  Lowest oxygen saturation was 87.0%    Events - The polysomnogram revealed a presence of 12 obstructive, no central, and no mixed apneas resulting in an Apnea index of 1.9 events per hour.  There were 40 hypopneas resulting in a Hypopnea index of 6.3 events per hour.  The combined Apnea/Hypopnea Index was 8.2 events per hour.  The REM AHI is 4.0.  The supine AHI is 18.8.  The RERA index was 10.4 per hour.   The RDI was  .    Movement Activity:      Limb Notes - There were 265 limb movements recorded.  Of this total, 223 were classified as PLMs.  Of the PLMs, 7 were associated with arousals.  The Limb Movement index was 41.6 per hour while the PLM index was 35.0per hour.    Behavior - none    Bruxism - none    Seizures - none    Cardiac Summary:   The average pulse rate was 49.6 bpm.  The minimum pulse rate was 38.9 bpm while the maximum pulse rate was 80.5 bpm.    Assessment:     Mild DELANEY (AHI 8)    Recommendations:    Options include a dental appliance if dentition allows, auto-titrating CPAP, ENT evaluation for surgery in carefully selected patients. Clinical correlation advised.     Advise regarding the risks of drowsy driving    Suggest optimizing sleep hygiene and avoiding sleep deprivation    Weight management    Dopaminergic therapy should be used for management of restless legs syndrome and not based on the  presence of periodic limb movements alone       Impaired fasting glucose 2012     Priority: Medium     Snoring 2012     Priority: Medium     Plantar fasciitis 2012     Priority: Medium     Hypertension goal BP (blood pressure) < 140/90 2012     Priority: Medium     HYPERLIPIDEMIA LDL GOAL <130 10/31/2010     Priority: Medium      Past Medical History:   Diagnosis Date     Essential hypertension, benign      Lateral epicondylitis      Mixed hyperlipidemia      Obesity, unspecified 2006    lost 50#     Sleep apnea     has CPAP     Past Surgical History:   Procedure Laterality Date     BIOPSY  2014    benign cysts on left forearm     COLONOSCOPY  2013    Procedure: COLONOSCOPY;  Colonscopy/Screen for colon cancer  [facility]  PKT sent [facility];  Surgeon: Annie Wall MD;  Location: [facility] OR     HERNIA REPAIR, UMBILICAL  2004    with mesh     LASIK  ,    had revisions x 2     ORTHOPEDIC SURGERY  2016    Right Hip replacement     REMOVAL OF SPERM DUCT(S)       Current Outpatient Medications   Medication Sig Dispense Refill     fish oil-omega-3 fatty acids 1000 MG capsule        Ibuprofen (ADVIL PO) Take 400 mg by mouth as needed       lisinopril (PRINIVIL/ZESTRIL) 30 MG tablet TK ONE T PO D 90 tablet 1     magnesium citrate solution Take 296 mLs by mouth once       simvastatin (ZOCOR) 20 MG tablet Take 1 tablet (20 mg) by mouth At Bedtime 90 tablet 3     Vitamin D, Cholecalciferol, 1000 units CAPS        OTC products: None, except as noted above    Allergies   Allergen Reactions     No Known Drug Allergies       Latex Allergy: NO    Social History     Tobacco Use     Smoking status: Former Smoker     Packs/day: 0.50     Years: 20.00     Pack years: 10.00     Types: Cigarettes     Last attempt to quit: 10/20/2010     Years since quittin.2     Smokeless tobacco: Never Used   Substance Use Topics     Alcohol use: Yes     Alcohol/week: 0.8 standard drinks     " Comment: occasionally 1-2 drinks per week     History   Drug Use No       REVIEW OF SYSTEMS:   CONSTITUTIONAL: NEGATIVE for fever, chills, change in weight  INTEGUMENTARY/SKIN: NEGATIVE for worrisome rashes, moles or lesions  EYES: NEGATIVE for vision changes or irritation  ENT/MOUTH: NEGATIVE for ear, mouth and throat problems  RESP: NEGATIVE for significant cough or SOB  BREAST: NEGATIVE for masses, tenderness or discharge  CV: NEGATIVE for chest pain, palpitations or peripheral edema  GI: NEGATIVE for nausea, abdominal pain, heartburn, or change in bowel habits  : NEGATIVE for frequency, dysuria, or hematuria  NEURO: NEGATIVE for weakness, dizziness or paresthesias  ENDOCRINE: NEGATIVE for temperature intolerance, skin/hair changes  HEME: NEGATIVE for bleeding problems  PSYCHIATRIC: NEGATIVE for changes in mood or affect    EXAM:   /84 (BP Location: Left arm, Patient Position: Sitting, Cuff Size: Adult Large)   Pulse 58   Temp 98.3  F (36.8  C) (Temporal)   Resp 16   Ht 1.801 m (5' 10.91\")   Wt 118.2 kg (260 lb 9.6 oz)   SpO2 97%   BMI 36.44 kg/m      GENERAL APPEARANCE: healthy, alert and no distress     EYES: EOMI,  PERRL     HENT: ear canals and TM's normal and nose and mouth without ulcers or lesions     NECK: no adenopathy, no asymmetry, masses, or scars and thyroid normal to palpation     RESP: lungs clear to auscultation - no rales, rhonchi or wheezes     CV: regular rates and rhythm, normal S1 S2, no S3 or S4 and no murmur, click or rub     ABDOMEN:  soft, nontender, no HSM or masses and bowel sounds normal     MS: extremities normal- no gross deformities noted, no evidence of inflammation in joints, FROM in all extremities.     SKIN: no suspicious lesions or rashes     NEURO: Normal strength and tone, sensory exam grossly normal, mentation intact and speech normal     PSYCH: mentation appears normal. and affect normal/bright     LYMPHATICS: No cervical adenopathy    DIAGNOSTICS:   No labs " or EKG required for low risk surgery (cataract, skin procedure, breast biopsy, etc)    Recent Labs   Lab Test 08/07/19  0947 07/18/18  0926 09/29/17  0922   HGB  --  16.8  --    PLT  --  220  --     137  --    POTASSIUM 4.5 4.9  --    CR 1.08 1.12  --    A1C  --  5.7* 5.5        IMPRESSION:   Reason for surgery/procedure: carpal tunnel left  Diagnosis/reason for consult: hypertension     The proposed surgical procedure is considered LOW risk.    REVISED CARDIAC RISK INDEX  The patient has the following serious cardiovascular risks for perioperative complications such as (MI, PE, VFib and 3  AV Block):  No serious cardiac risks  INTERPRETATION: 0 risks: Class I (very low risk - 0.4% complication rate)    The patient has the following additional risks for perioperative complications:  No identified additional risks      ICD-10-CM    1. Preop general physical exam Z01.818    2. Carpal tunnel syndrome of left wrist G56.02    3. Hypertension goal BP (blood pressure) < 140/90 I10    4. Morbid obesity (H) E66.01    5. Need for prophylactic vaccination and inoculation against influenza Z23 INFLUENZA QUAD, RECOMBINANT, P-FREE (RIV4) (FLUBLOCK) [81638]     Vaccine Administration, Initial [43832]       RECOMMENDATIONS:         --Patient is to take all scheduled medications on the day of surgery EXCEPT for modifications listed below.    ACE Inhibitor or Angiotensin Receptor Blocker (ARB) Use  Ace inhibitor or Angiotensin Receptor Blocker (ARB) and should HOLD this medication for the 24 hours prior to surgery.      APPROVAL GIVEN to proceed with proposed procedure, without further diagnostic evaluation       Signed Electronically by: Annie Wall MD    Copy of this evaluation report is provided to requesting physician.    [place] Preop Guidelines    Revised Cardiac Risk Index

## 2019-12-26 NOTE — PATIENT INSTRUCTIONS
Do not take lisinopril within 24 hours of your procedure.         Before Your Surgery      Call your surgeon if there is any change in your health. This includes signs of a cold or flu (such as a sore throat, runny nose, cough, rash or fever).    Do not smoke, drink alcohol or take over the counter medicine (unless your surgeon or primary care doctor tells you to) for the 24 hours before and after surgery.    If you take prescribed drugs: Follow your doctor s orders about which medicines to take and which to stop until after surgery.    Eating and drinking prior to surgery: follow the instructions from your surgeon    Take a shower or bath the night before surgery. Use the soap your surgeon gave you to gently clean your skin. If you do not have soap from your surgeon, use your regular soap. Do not shave or scrub the surgery site.  Wear clean pajamas and have clean sheets on your bed.

## 2019-12-31 ENCOUNTER — OFFICE VISIT (OUTPATIENT)
Dept: FAMILY MEDICINE | Facility: CLINIC | Age: 59
End: 2019-12-31
Payer: COMMERCIAL

## 2019-12-31 VITALS
HEIGHT: 71 IN | TEMPERATURE: 98.3 F | WEIGHT: 260.6 LBS | RESPIRATION RATE: 16 BRPM | HEART RATE: 58 BPM | BODY MASS INDEX: 36.48 KG/M2 | OXYGEN SATURATION: 97 % | DIASTOLIC BLOOD PRESSURE: 84 MMHG | SYSTOLIC BLOOD PRESSURE: 128 MMHG

## 2019-12-31 DIAGNOSIS — Z23 NEED FOR PROPHYLACTIC VACCINATION AND INOCULATION AGAINST INFLUENZA: ICD-10-CM

## 2019-12-31 DIAGNOSIS — Z01.818 PREOP GENERAL PHYSICAL EXAM: Primary | ICD-10-CM

## 2019-12-31 DIAGNOSIS — G56.02 CARPAL TUNNEL SYNDROME OF LEFT WRIST: ICD-10-CM

## 2019-12-31 DIAGNOSIS — I10 HYPERTENSION GOAL BP (BLOOD PRESSURE) < 140/90: Chronic | ICD-10-CM

## 2019-12-31 DIAGNOSIS — E66.01 MORBID OBESITY (H): ICD-10-CM

## 2019-12-31 PROCEDURE — 90471 IMMUNIZATION ADMIN: CPT | Performed by: FAMILY MEDICINE

## 2019-12-31 PROCEDURE — 90682 RIV4 VACC RECOMBINANT DNA IM: CPT | Performed by: FAMILY MEDICINE

## 2019-12-31 PROCEDURE — 99215 OFFICE O/P EST HI 40 MIN: CPT | Mod: 25 | Performed by: FAMILY MEDICINE

## 2019-12-31 ASSESSMENT — MIFFLIN-ST. JEOR: SCORE: 2027.7

## 2019-12-31 ASSESSMENT — PAIN SCALES - GENERAL: PAINLEVEL: NO PAIN (0)

## 2020-01-22 ENCOUNTER — TRANSFERRED RECORDS (OUTPATIENT)
Dept: HEALTH INFORMATION MANAGEMENT | Facility: CLINIC | Age: 60
End: 2020-01-22

## 2020-02-23 DIAGNOSIS — I10 HYPERTENSION GOAL BP (BLOOD PRESSURE) < 140/90: Chronic | ICD-10-CM

## 2020-02-24 RX ORDER — LISINOPRIL 30 MG/1
TABLET ORAL
Qty: 90 TABLET | Refills: 1 | Status: SHIPPED | OUTPATIENT
Start: 2020-02-24 | End: 2020-10-07

## 2020-02-24 NOTE — TELEPHONE ENCOUNTER
Pending Prescriptions:                       Disp   Refills    lisinopril (ZESTRIL) 30 MG tablet [Pharma*90 tab*1            Sig: TAKE 1 TABLET BY MOUTH EVERY DAY    Prescription approved per [facility] Refill Protocol.    Keila Yepez, MSN, RN

## 2020-08-21 DIAGNOSIS — I10 HYPERTENSION GOAL BP (BLOOD PRESSURE) < 140/90: Chronic | ICD-10-CM

## 2020-08-21 DIAGNOSIS — E78.5 HYPERLIPIDEMIA LDL GOAL <130: ICD-10-CM

## 2020-08-21 RX ORDER — LISINOPRIL 30 MG/1
TABLET ORAL
Qty: 90 TABLET | Refills: 1 | OUTPATIENT
Start: 2020-08-21

## 2020-08-21 RX ORDER — SIMVASTATIN 20 MG
TABLET ORAL
Qty: 90 TABLET | Refills: 3 | OUTPATIENT
Start: 2020-08-21

## 2020-08-21 NOTE — TELEPHONE ENCOUNTER
Pending Prescriptions:                       Disp   Refills    lisinopril (ZESTRIL) 30 MG tablet [Pharmac*90 tab*1        Sig: TAKE 1 TABLET BY MOUTH EVERY DAY    simvastatin (ZOCOR) 20 MG tablet [Pharmacy*90 tab*3        Sig: TAKE 1 TABLET(20 MG) BY MOUTH AT BEDTIME      Support Staff:   Please call patient to schedule a visit. (F2F, Video, Phone or E-visit) Physical Exam.  And labs  Then ask if patient will need a refill of the above medication before the visit.   Please reflag for RN and route to pool to give a 30 or 90 day carmenza to get them to their visit or to remove the medication and to close the encounter.    Thank you!    Percy Herrera, BSN, RN, PHN

## 2020-08-21 NOTE — TELEPHONE ENCOUNTER
Pt informed.  He will call back to schedule.  He has enough medication until he schedules.    RN - please un-pend medication so encounter can be closed.

## 2020-10-06 ENCOUNTER — MYC MEDICAL ADVICE (OUTPATIENT)
Dept: FAMILY MEDICINE | Facility: CLINIC | Age: 60
End: 2020-10-06

## 2020-10-06 DIAGNOSIS — E78.5 HYPERLIPIDEMIA LDL GOAL <130: ICD-10-CM

## 2020-10-06 DIAGNOSIS — I10 HYPERTENSION GOAL BP (BLOOD PRESSURE) < 140/90: Chronic | ICD-10-CM

## 2020-10-07 RX ORDER — LISINOPRIL 30 MG/1
TABLET ORAL
Qty: 90 TABLET | Refills: 0 | Status: SHIPPED | OUTPATIENT
Start: 2020-10-07 | End: 2020-12-01 | Stop reason: DRUGHIGH

## 2020-10-07 RX ORDER — SIMVASTATIN 20 MG
20 TABLET ORAL AT BEDTIME
Qty: 90 TABLET | Refills: 0 | Status: SHIPPED | OUTPATIENT
Start: 2020-10-07 | End: 2020-12-02

## 2020-11-25 NOTE — PATIENT INSTRUCTIONS
Increase lisinopril to 40 mg daily. New prescription sent.     Follow blood pressure at home.       Preventive Health Recommendations  Male Ages 50 - 64    Yearly exam:             See your health care provider every year in order to  o   Review health changes.   o   Discuss preventive care.    o   Review your medicines if your doctor has prescribed any.     Have a cholesterol test every 5 years, or more frequently if you are at risk for high cholesterol/heart disease.     Have a diabetes test (fasting glucose) every three years. If you are at risk for diabetes, you should have this test more often.     Have a colonoscopy at age 50, or have a yearly FIT test (stool test). These exams will check for colon cancer.      Talk with your health care provider about whether or not a prostate cancer screening test (PSA) is right for you.    You should be tested each year for STDs (sexually transmitted diseases), if you re at risk.     Shots: Get a flu shot each year. Get a tetanus shot every 10 years.     Nutrition:    Eat at least 5 servings of fruits and vegetables daily.     Eat whole-grain bread, whole-wheat pasta and brown rice instead of white grains and rice.     Get adequate Calcium and Vitamin D.     Lifestyle    Exercise for at least 150 minutes a week (30 minutes a day, 5 days a week). This will help you control your weight and prevent disease.     Limit alcohol to one drink per day.     No smoking.     Wear sunscreen to prevent skin cancer.     See your dentist every six months for an exam and cleaning.     See your eye doctor every 1 to 2 years.

## 2020-11-25 NOTE — PROGRESS NOTES
SUBJECTIVE:   CC: Henrry Cortes is an 59 year old male who presents for preventative health visit.       Patient has been advised of split billing requirements and indicates understanding: Yes  Healthy Habits:     Getting at least 3 servings of Calcium per day:  Yes    Bi-annual eye exam:  Yes    Dental care twice a year:  Yes    Sleep apnea or symptoms of sleep apnea:  Excessive snoring and Sleep apnea    Diet:  Regular (no restrictions)    Frequency of exercise:  2-3 days/week    Duration of exercise:  30-45 minutes    Taking medications regularly:  Yes    Medication side effects:  None    PHQ-2 Total Score: 0    Additional concerns today:  Yes          Hyperlipidemia Follow-Up      Are you regularly taking any medication or supplement to lower your cholesterol?   Yes- simvastatin    Are you having muscle aches or other side effects that you think could be caused by your cholesterol lowering medication?  No    Hypertension Follow-up      Do you check your blood pressure regularly outside of the clinic? Yes     Are you following a low salt diet? Yes    Are your blood pressures ever more than 140 on the top number (systolic) OR more   than 90 on the bottom number (diastolic), for example 140/90? Yes      BILATERAL CARPAL  Doing well. Has good sensation now both hands.     SLEEP APNEA  Doing well with current CPAP and settings. Feels rested. No concerns.     THIGH NUMBNESS   Left thigh. Comes and goes laterally when standing for long periods. No other time. Resolves.   No weakness. No hip or leg pain.     Today's PHQ-2 Score:   PHQ-2 ( 1999 Pfizer) 11/30/2020   Q1: Little interest or pleasure in doing things 0   Q2: Feeling down, depressed or hopeless 0   PHQ-2 Score 0   Q1: Little interest or pleasure in doing things Not at all   Q2: Feeling down, depressed or hopeless Not at all   PHQ-2 Score 0       Abuse: Current or Past(Physical, Sexual or Emotional)- No  Do you feel safe in your environment? Yes    Have you  ever done Advance Care Planning? (For example, a Health Directive, POLST, or a discussion with a medical provider or your loved ones about your wishes): Yes, patient states has an Advance Care Planning document and will bring a copy to the clinic.    Social History     Tobacco Use     Smoking status: Former Smoker     Packs/day: 0.50     Years: 20.00     Pack years: 10.00     Types: Cigarettes     Quit date: 10/20/2010     Years since quitting: 10.1     Smokeless tobacco: Never Used   Substance Use Topics     Alcohol use: Yes     Alcohol/week: 0.8 standard drinks     Comment: occasionally 1-2 drinks per week     If you drink alcohol do you typically have >3 drinks per day or >7 drinks per week? No    Alcohol Use 11/30/2020   Prescreen: >3 drinks/day or >7 drinks/week? No   Prescreen: >3 drinks/day or >7 drinks/week? -   No flowsheet data found.    Last PSA:   PSA   Date Value Ref Range Status   08/07/2019 0.38 0 - 4 ug/L Final     Comment:     Assay Method:  Chemiluminescence using Siemens Vista analyzer       Reviewed orders with patient. Reviewed health maintenance and updated orders accordingly - Yes  BP Readings from Last 3 Encounters:   12/01/20 (!) 142/80   12/31/19 128/84   10/03/19 116/80    Wt Readings from Last 3 Encounters:   12/01/20 124.7 kg (275 lb)   12/31/19 118.2 kg (260 lb 9.6 oz)   10/03/19 110.8 kg (244 lb 3.2 oz)                    Reviewed and updated as needed this visit by clinical staff  Tobacco  Allergies  Meds  Problems  Med Hx  Surg Hx  Fam Hx          Reviewed and updated as needed this visit by Provider  Tobacco  Allergies  Meds  Problems  Med Hx  Surg Hx  Fam Hx             Review of Systems   Constitutional: Negative for chills and fever.   HENT: Negative for congestion, ear pain, hearing loss and sore throat.    Eyes: Negative for pain and visual disturbance.   Respiratory: Negative for cough and shortness of breath.    Cardiovascular: Negative for chest pain,  "palpitations and peripheral edema.   Gastrointestinal: Negative for abdominal pain, constipation, diarrhea, heartburn, hematochezia and nausea.   Genitourinary: Negative for discharge, dysuria, frequency, genital sores, hematuria, impotence and urgency.   Musculoskeletal: Negative for arthralgias, joint swelling and myalgias.   Skin: Negative for rash.   Neurological: Negative for dizziness, weakness, headaches and paresthesias.   Psychiatric/Behavioral: Negative for mood changes. The patient is not nervous/anxious.          OBJECTIVE:   BP (!) 142/80   Pulse 60   Temp 98  F (36.7  C) (Temporal)   Resp 16   Ht 1.803 m (5' 11\")   Wt 124.7 kg (275 lb)   SpO2 98%   BMI 38.35 kg/m      Physical Exam  GENERAL: healthy, alert and no distress  EYES: Eyes grossly normal to inspection, PERRL and conjunctivae and sclerae normal  HENT: ear canals and TM's normal, nose and mouth without ulcers or lesions  NECK: no adenopathy, no asymmetry, masses, or scars and thyroid normal to palpation  RESP: lungs clear to auscultation - no rales, rhonchi or wheezes  CV: regular rate and rhythm, normal S1 S2, no S3 or S4, no murmur, click or rub, no peripheral edema and peripheral pulses strong  ABDOMEN: soft, nontender, no hepatosplenomegaly, no masses and bowel sounds normal  MS: no gross musculoskeletal defects noted, no edema  SKIN: no suspicious lesions or rashes  NEURO: Normal strength and tone, mentation intact and speech normal  PSYCH: mentation appears normal, affect normal/bright    Diagnostic Test Results:  Labs reviewed in Epic  pending    ASSESSMENT/PLAN:   Routine general medical examination at a health care facility  See counseling messages     Hypertension goal BP (blood pressure) < 140/90  Tolerating medication well.   Has had an increase in blood pressure since increased weight and less activity.   Will increase lisinopril to 40 mg daily.   He will follow blood pressure at home.   - lisinopril (ZESTRIL) 40 MG tablet; " "Take 1 tablet (40 mg) by mouth daily  Dispense: 90 tablet; Refill: 1    Morbid Obesity  Ric is working on weight loss and improving lifestyle choices.   Continue with decreased alcohol, no smoking.   Increase activity/exercise. Working on cleaning up diet. Doing intermittent fasting.     Impaired fasting glucose  History of. Will notify of results.   - COMPREHENSIVE METABOLIC PANEL    DELANEY (obstructive sleep apnea)-Mild (AHI 8)  Continue with CPAP. Doing well.   He will reach out when new supplies needed.     Lateral Femoral Cutaneous Nerve compression  Patient with numbness left lateral thigh that comes and goes.   Not currently an issue today.   Likely compression of the nerve. Can be from clothing as it is with standing.   Will monitor and call for changes/concerns.     Screening for prostate cancer  Will notify of results.   - PSA, screen    Screening for hyperlipidemia  Will notify of results.   - Lipid panel reflex to direct LDL Fasting    Encounter for immunization  Patient is due for second.   - zoster vaccine recombinant adjuvanted (SHINGRIX) injection; Inject 0.5 mLs into the muscle once for 1 dose  Dispense: 0.5 mL; Refill: 0  - ZOSTER VACCINE RECOMBINANT ADJUVANTED IM NJX    Patient has been advised of split billing requirements and indicates understanding: Yes  COUNSELING:   Reviewed preventive health counseling, as reflected in patient instructions       Regular exercise       Healthy diet/nutrition    Estimated body mass index is 38.35 kg/m  as calculated from the following:    Height as of this encounter: 1.803 m (5' 11\").    Weight as of this encounter: 124.7 kg (275 lb).     Weight management plan: Discussed healthy diet and exercise guidelines    He reports that he quit smoking about 10 years ago. His smoking use included cigarettes. He has a 10.00 pack-year smoking history. He has never used smokeless tobacco.      Counseling Resources:  ATP IV Guidelines  Pooled Cohorts Equation Calculator  FRAX " Risk Assessment  ICSI Preventive Guidelines  Dietary Guidelines for Americans, 2010  USDA's MyPlate  ASA Prophylaxis  Lung CA Screening    [place]nie Wall MD  [facility]

## 2020-11-30 ASSESSMENT — ENCOUNTER SYMPTOMS
JOINT SWELLING: 0
NAUSEA: 0
DIARRHEA: 0
HEARTBURN: 0
PALPITATIONS: 0
DIZZINESS: 0
SHORTNESS OF BREATH: 0
COUGH: 0
FREQUENCY: 0
SORE THROAT: 0
PARESTHESIAS: 0
EYE PAIN: 0
FEVER: 0
CHILLS: 0
CONSTIPATION: 0
HEADACHES: 0
ABDOMINAL PAIN: 0
WEAKNESS: 0
ARTHRALGIAS: 0
HEMATURIA: 0
DYSURIA: 0
HEMATOCHEZIA: 0
NERVOUS/ANXIOUS: 0
MYALGIAS: 0

## 2020-12-01 ENCOUNTER — OFFICE VISIT (OUTPATIENT)
Dept: FAMILY MEDICINE | Facility: CLINIC | Age: 60
End: 2020-12-01
Payer: COMMERCIAL

## 2020-12-01 VITALS
TEMPERATURE: 98 F | HEART RATE: 60 BPM | BODY MASS INDEX: 38.5 KG/M2 | DIASTOLIC BLOOD PRESSURE: 80 MMHG | OXYGEN SATURATION: 98 % | WEIGHT: 275 LBS | RESPIRATION RATE: 16 BRPM | SYSTOLIC BLOOD PRESSURE: 142 MMHG | HEIGHT: 71 IN

## 2020-12-01 DIAGNOSIS — I10 HYPERTENSION GOAL BP (BLOOD PRESSURE) < 140/90: Chronic | ICD-10-CM

## 2020-12-01 DIAGNOSIS — G47.33 OSA (OBSTRUCTIVE SLEEP APNEA): ICD-10-CM

## 2020-12-01 DIAGNOSIS — Z00.00 ROUTINE GENERAL MEDICAL EXAMINATION AT A HEALTH CARE FACILITY: Primary | ICD-10-CM

## 2020-12-01 DIAGNOSIS — Z13.220 SCREENING FOR HYPERLIPIDEMIA: ICD-10-CM

## 2020-12-01 DIAGNOSIS — E66.01 MORBID OBESITY (H): ICD-10-CM

## 2020-12-01 DIAGNOSIS — R73.01 IMPAIRED FASTING GLUCOSE: ICD-10-CM

## 2020-12-01 DIAGNOSIS — G57.12 LATERAL CUTANEOUS FEMORAL NERVE OF THIGH COMPRESSION OR SYNDROME, LEFT: ICD-10-CM

## 2020-12-01 DIAGNOSIS — Z12.5 SCREENING FOR PROSTATE CANCER: ICD-10-CM

## 2020-12-01 DIAGNOSIS — Z23 ENCOUNTER FOR IMMUNIZATION: ICD-10-CM

## 2020-12-01 DIAGNOSIS — E78.5 HYPERLIPIDEMIA LDL GOAL <130: ICD-10-CM

## 2020-12-01 LAB
ALBUMIN SERPL-MCNC: 4.2 G/DL (ref 3.4–5)
ALP SERPL-CCNC: 51 U/L (ref 40–150)
ALT SERPL W P-5'-P-CCNC: 54 U/L (ref 0–70)
ANION GAP SERPL CALCULATED.3IONS-SCNC: 5 MMOL/L (ref 3–14)
AST SERPL W P-5'-P-CCNC: 24 U/L (ref 0–45)
BILIRUB SERPL-MCNC: 0.5 MG/DL (ref 0.2–1.3)
BUN SERPL-MCNC: 17 MG/DL (ref 7–30)
CALCIUM SERPL-MCNC: 9.2 MG/DL (ref 8.5–10.1)
CHLORIDE SERPL-SCNC: 105 MMOL/L (ref 94–109)
CHOLEST SERPL-MCNC: 231 MG/DL
CO2 SERPL-SCNC: 30 MMOL/L (ref 20–32)
CREAT SERPL-MCNC: 0.96 MG/DL (ref 0.66–1.25)
GFR SERPL CREATININE-BSD FRML MDRD: 87 ML/MIN/{1.73_M2}
GLUCOSE SERPL-MCNC: 104 MG/DL (ref 70–99)
HDLC SERPL-MCNC: 78 MG/DL
LDLC SERPL CALC-MCNC: 127 MG/DL
NONHDLC SERPL-MCNC: 153 MG/DL
POTASSIUM SERPL-SCNC: 4.3 MMOL/L (ref 3.4–5.3)
PROT SERPL-MCNC: 7.6 G/DL (ref 6.8–8.8)
PSA SERPL-ACNC: 0.35 UG/L (ref 0–4)
SODIUM SERPL-SCNC: 140 MMOL/L (ref 133–144)
TRIGL SERPL-MCNC: 131 MG/DL

## 2020-12-01 PROCEDURE — 99396 PREV VISIT EST AGE 40-64: CPT | Mod: 25 | Performed by: FAMILY MEDICINE

## 2020-12-01 PROCEDURE — 90471 IMMUNIZATION ADMIN: CPT | Performed by: FAMILY MEDICINE

## 2020-12-01 PROCEDURE — 90750 HZV VACC RECOMBINANT IM: CPT | Performed by: FAMILY MEDICINE

## 2020-12-01 PROCEDURE — 90472 IMMUNIZATION ADMIN EACH ADD: CPT | Performed by: FAMILY MEDICINE

## 2020-12-01 PROCEDURE — G0103 PSA SCREENING: HCPCS | Performed by: FAMILY MEDICINE

## 2020-12-01 PROCEDURE — 36415 COLL VENOUS BLD VENIPUNCTURE: CPT | Performed by: FAMILY MEDICINE

## 2020-12-01 PROCEDURE — 80053 COMPREHEN METABOLIC PANEL: CPT | Performed by: FAMILY MEDICINE

## 2020-12-01 PROCEDURE — 80061 LIPID PANEL: CPT | Performed by: FAMILY MEDICINE

## 2020-12-01 PROCEDURE — 90682 RIV4 VACC RECOMBINANT DNA IM: CPT | Performed by: FAMILY MEDICINE

## 2020-12-01 PROCEDURE — 99213 OFFICE O/P EST LOW 20 MIN: CPT | Mod: 25 | Performed by: FAMILY MEDICINE

## 2020-12-01 RX ORDER — LISINOPRIL 40 MG/1
40 TABLET ORAL DAILY
Qty: 90 TABLET | Refills: 1 | Status: SHIPPED | OUTPATIENT
Start: 2020-12-01 | End: 2021-04-08

## 2020-12-01 ASSESSMENT — ENCOUNTER SYMPTOMS
NERVOUS/ANXIOUS: 0
DIARRHEA: 0
HEADACHES: 0
DYSURIA: 0
HEARTBURN: 0
SHORTNESS OF BREATH: 0
NAUSEA: 0
FEVER: 0
ARTHRALGIAS: 0
JOINT SWELLING: 0
PARESTHESIAS: 0
EYE PAIN: 0
WEAKNESS: 0
FREQUENCY: 0
PALPITATIONS: 0
HEMATURIA: 0
DIZZINESS: 0
MYALGIAS: 0
ABDOMINAL PAIN: 0
CONSTIPATION: 0
COUGH: 0
CHILLS: 0
HEMATOCHEZIA: 0
SORE THROAT: 0

## 2020-12-01 ASSESSMENT — MIFFLIN-ST. JEOR: SCORE: 2094.52

## 2020-12-01 NOTE — PROGRESS NOTES
Prior to immunization administration, verified patients identity using patient s name and date of birth. Please see Immunization Activity for additional information.     Screening Questionnaire for Adult Immunization    Are you sick today?   No   Do you have allergies to medications, food, a vaccine component or latex?   No   Have you ever had a serious reaction after receiving a vaccination?   No   Do you have a long-term health problem with heart, lung, kidney, or metabolic disease (e.g., diabetes), asthma, a blood disorder, no spleen, complement component deficiency, a cochlear implant, or a spinal fluid leak?  Are you on long-term aspirin therapy?   No   Do you have cancer, leukemia, HIV/AIDS, or any other immune system problem?   No   Do you have a parent, brother, or sister with an immune system problem?   No   In the past 3 months, have you taken medications that affect  your immune system, such as prednisone, other steroids, or anticancer drugs; drugs for the treatment of rheumatoid arthritis, Crohn s disease, or psoriasis; or have you had radiation treatments?   No   Have you had a seizure, or a brain or other nervous system problem?   No   During the past year, have you received a transfusion of blood or blood    products, or been given immune (gamma) globulin or antiviral drug?   No   For women: Are you pregnant or is there a chance you could become       pregnant during the next month?   No   Have you received any vaccinations in the past 4 weeks?   No     Immunization questionnaire answers were all negative.        Per orders of Dr. Wall, injection of flu shot and shingrix given by Lane Khan CMA. Patient instructed to remain in clinic for 15 minutes afterwards, and to report any adverse reaction to me immediately.       Screening performed by Lane Khan CMA on 12/1/2020 at 11:11 AM.

## 2020-12-02 ENCOUNTER — MYC MEDICAL ADVICE (OUTPATIENT)
Dept: FAMILY MEDICINE | Facility: CLINIC | Age: 60
End: 2020-12-02

## 2020-12-02 RX ORDER — SIMVASTATIN 20 MG
20 TABLET ORAL AT BEDTIME
Qty: 90 TABLET | Refills: 3 | Status: SHIPPED | OUTPATIENT
Start: 2020-12-02 | End: 2021-12-21

## 2020-12-22 ENCOUNTER — MYC MEDICAL ADVICE (OUTPATIENT)
Dept: FAMILY MEDICINE | Facility: CLINIC | Age: 60
End: 2020-12-22

## 2020-12-22 DIAGNOSIS — G47.33 OSA (OBSTRUCTIVE SLEEP APNEA): Primary | ICD-10-CM

## 2020-12-23 NOTE — TELEPHONE ENCOUNTER
New order placed for CPAP supplies. I am unclear if this needs to be forwarded to [facility].

## 2021-01-07 ENCOUNTER — DOCUMENTATION ONLY (OUTPATIENT)
Dept: OTHER | Facility: CLINIC | Age: 61
End: 2021-01-07

## 2021-03-15 DIAGNOSIS — I10 HYPERTENSION GOAL BP (BLOOD PRESSURE) < 140/90: Chronic | ICD-10-CM

## 2021-03-16 RX ORDER — LISINOPRIL 40 MG/1
TABLET ORAL
Qty: 90 TABLET | Refills: 1 | OUTPATIENT
Start: 2021-03-16

## 2021-03-16 NOTE — TELEPHONE ENCOUNTER
Prescription was sent 12/1/20 for #90 with 1 refills.  Pharmacy notified via E-Prescribe refusal.     Latonia Christianson RN on 3/16/2021 at 2:10 PM

## 2021-03-22 ENCOUNTER — IMMUNIZATION (OUTPATIENT)
Dept: PEDIATRICS | Facility: CLINIC | Age: 61
End: 2021-03-22
Payer: COMMERCIAL

## 2021-03-22 PROCEDURE — 91300 PR COVID VAC PFIZER DIL RECON 30 MCG/0.3 ML IM: CPT

## 2021-03-22 PROCEDURE — 0001A PR COVID VAC PFIZER DIL RECON 30 MCG/0.3 ML IM: CPT

## 2021-04-07 DIAGNOSIS — I10 HYPERTENSION GOAL BP (BLOOD PRESSURE) < 140/90: Chronic | ICD-10-CM

## 2021-04-08 RX ORDER — LISINOPRIL 40 MG/1
TABLET ORAL
Qty: 30 TABLET | Refills: 0 | Status: SHIPPED | OUTPATIENT
Start: 2021-04-08 | End: 2021-04-15

## 2021-04-12 ENCOUNTER — MYC MEDICAL ADVICE (OUTPATIENT)
Dept: FAMILY MEDICINE | Facility: CLINIC | Age: 61
End: 2021-04-12

## 2021-04-12 ENCOUNTER — IMMUNIZATION (OUTPATIENT)
Dept: PEDIATRICS | Facility: CLINIC | Age: 61
End: 2021-04-12
Attending: INTERNAL MEDICINE
Payer: COMMERCIAL

## 2021-04-12 DIAGNOSIS — I10 HYPERTENSION GOAL BP (BLOOD PRESSURE) < 140/90: Chronic | ICD-10-CM

## 2021-04-12 PROCEDURE — 91300 PR COVID VAC PFIZER DIL RECON 30 MCG/0.3 ML IM: CPT

## 2021-04-12 PROCEDURE — 0002A PR COVID VAC PFIZER DIL RECON 30 MCG/0.3 ML IM: CPT

## 2021-04-15 VITALS — DIASTOLIC BLOOD PRESSURE: 80 MMHG | SYSTOLIC BLOOD PRESSURE: 132 MMHG

## 2021-04-15 RX ORDER — LISINOPRIL 40 MG/1
40 TABLET ORAL DAILY
Qty: 90 TABLET | Refills: 2 | Status: SHIPPED | OUTPATIENT
Start: 2021-04-15 | End: 2021-12-21

## 2021-04-15 NOTE — TELEPHONE ENCOUNTER
Please enter home blood pressure reading into chart if able. Verify with patient if automatic cuff. If not able, please have patient come in for float visit in the next few weeks.      Refills sent for medication.

## 2021-05-03 ENCOUNTER — TRANSFERRED RECORDS (OUTPATIENT)
Dept: HEALTH INFORMATION MANAGEMENT | Facility: CLINIC | Age: 61
End: 2021-05-03

## 2021-05-04 NOTE — TELEPHONE ENCOUNTER
CHANGED PEEP TO 8. SPO2  97%. NO COMPLICATIONS. Pending Prescriptions:                       Disp   Refills    lisinopril (ZESTRIL) 40 MG tablet [Pharmac*90 tab*1        Sig: TAKE 1 TABLET(40 MG) BY MOUTH DAILY      Routing refill request to provider for review/approval because:  Labs out of range:  Blood pressure    Latonia Christianson RN on 4/8/2021 at 3:40 PM

## 2021-10-23 ENCOUNTER — HEALTH MAINTENANCE LETTER (OUTPATIENT)
Age: 61
End: 2021-10-23

## 2021-11-09 ENCOUNTER — MYC MEDICAL ADVICE (OUTPATIENT)
Dept: FAMILY MEDICINE | Facility: CLINIC | Age: 61
End: 2021-11-09
Payer: COMMERCIAL

## 2021-12-20 DIAGNOSIS — I10 HYPERTENSION GOAL BP (BLOOD PRESSURE) < 140/90: Chronic | ICD-10-CM

## 2021-12-20 DIAGNOSIS — E78.5 HYPERLIPIDEMIA LDL GOAL <130: ICD-10-CM

## 2021-12-21 RX ORDER — SIMVASTATIN 20 MG
TABLET ORAL
Qty: 90 TABLET | Refills: 0 | Status: SHIPPED | OUTPATIENT
Start: 2021-12-21 | End: 2022-03-22

## 2021-12-21 RX ORDER — LISINOPRIL 40 MG/1
TABLET ORAL
Qty: 90 TABLET | Refills: 0 | Status: SHIPPED | OUTPATIENT
Start: 2021-12-21 | End: 2022-03-22

## 2021-12-21 NOTE — TELEPHONE ENCOUNTER
Pending Prescriptions:                       Disp   Refills    simvastatin (ZOCOR) 20 MG tablet [Pharmac*90 tab*0            Sig: TAKE 1 TABLET(20 MG) BY MOUTH AT BEDTIME    lisinopril (ZESTRIL) 40 MG tablet [Pharma*90 tab*0            Sig: TAKE 1 TABLET(40 MG) BY MOUTH DAILY    Medication is being filled for 1 time carmenza refill only due to:  Patient is due for annual and med check, fasting labs    Please call and help schedule.  Thank you!

## 2021-12-21 NOTE — TELEPHONE ENCOUNTER
Patient called back stating he will [facility] appointment [facility]

## 2022-02-12 ENCOUNTER — HEALTH MAINTENANCE LETTER (OUTPATIENT)
Age: 62
End: 2022-02-12

## 2022-03-20 DIAGNOSIS — E78.5 HYPERLIPIDEMIA LDL GOAL <130: ICD-10-CM

## 2022-03-20 DIAGNOSIS — I10 HYPERTENSION GOAL BP (BLOOD PRESSURE) < 140/90: Chronic | ICD-10-CM

## 2022-03-22 RX ORDER — SIMVASTATIN 20 MG
TABLET ORAL
Qty: 90 TABLET | Refills: 0 | Status: SHIPPED | OUTPATIENT
Start: 2022-03-22 | End: 2022-04-23

## 2022-03-22 RX ORDER — LISINOPRIL 40 MG/1
TABLET ORAL
Qty: 90 TABLET | Refills: 0 | Status: SHIPPED | OUTPATIENT
Start: 2022-03-22 | End: 2022-04-23

## 2022-03-22 NOTE — TELEPHONE ENCOUNTER
Pending Prescriptions:                       Disp   Refills    simvastatin (ZOCOR) 20 MG tablet [Pharmacy*90 tab*0        Sig: TAKE 1 TABLET(20 MG) BY MOUTH AT BEDTIME    lisinopril (ZESTRIL) 40 MG tablet [Pharmac*90 tab*0        Sig: TAKE 1 TABLET(40 MG) BY MOUTH DAILY    Routing refill request to provider for review/approval because:  Joy given x1 and patient did not follow up, please advise  Patient needs to be seen because it has been more than 1 year since last office visit.

## 2022-04-05 ENCOUNTER — TRANSFERRED RECORDS (OUTPATIENT)
Dept: HEALTH INFORMATION MANAGEMENT | Facility: CLINIC | Age: 62
End: 2022-04-05
Payer: COMMERCIAL

## 2022-04-12 ENCOUNTER — TRANSFERRED RECORDS (OUTPATIENT)
Dept: HEALTH INFORMATION MANAGEMENT | Facility: CLINIC | Age: 62
End: 2022-04-12
Payer: COMMERCIAL

## 2022-04-13 NOTE — PROGRESS NOTES
SUBJECTIVE:   CC: Henrry Cortes is an 61 year old male who presents for preventative health visit.         Patient has been advised of split billing requirements and indicates understanding: Yes     Healthy Habits:     Getting at least 3 servings of Calcium per day:  Yes    Bi-annual eye exam:  NO    Dental care twice a year:  Yes    Sleep apnea or symptoms of sleep apnea:  Excessive snoring    Diet:  Regular (no restrictions), Carbohydrate counting and Gluten-free/reduced    Frequency of exercise:  6-7 days/week    Duration of exercise:  Greater than 60 minutes    Taking medications regularly:  Yes    Medication side effects:  None    PHQ-2 Total Score: 0    Additional concerns today:  No       Hyperlipidemia Follow-Up      Are you regularly taking any medication or supplement to lower your cholesterol?   Yes- simvastatin    Are you having muscle aches or other side effects that you think could be caused by your cholesterol lowering medication?  No    Hypertension Follow-up      Do you check your blood pressure regularly outside of the clinic? Yes     Are you following a low salt diet? Yes    Are your blood pressures ever more than 140 on the top number (systolic) OR more   than 90 on the bottom number (diastolic), for example 140/90? No      Would like to discuss umbilical hernia. Would like to consider having it fixed.  He is not having any pain.  Has not been increasing in size.  He did have repair in the past in 2004.  He is unclear whether mesh was used or not.    Today's PHQ-2 Score:   PHQ-2 ( 1999 Pfizer) 4/22/2022   Q1: Little interest or pleasure in doing things 0   Q2: Feeling down, depressed or hopeless 0   PHQ-2 Score 0   PHQ-2 Total Score (12-17 Years)- Positive if 3 or more points; Administer PHQ-A if positive -   Q1: Little interest or pleasure in doing things Not at all   Q2: Feeling down, depressed or hopeless Not at all   PHQ-2 Score 0       Abuse: Current or Past(Physical, Sexual or Emotional)-  No  Do you feel safe in your environment? Yes        Social History     Tobacco Use     Smoking status: Former Smoker     Packs/day: 0.50     Years: 20.00     Pack years: 10.00     Types: Cigarettes     Quit date: 10/20/2010     Years since quittin.5     Smokeless tobacco: Never Used   Substance Use Topics     Alcohol use: Yes     Alcohol/week: 0.8 standard drinks     Comment: occasionally 1-2 drinks per week         Alcohol Use 2022   Prescreen: >3 drinks/day or >7 drinks/week? No   Prescreen: >3 drinks/day or >7 drinks/week? -       Last PSA:   PSA   Date Value Ref Range Status   2020 0.35 0 - 4 ug/L Final     Comment:     Assay Method:  Chemiluminescence using Siemens Vista analyzer     Prostate Specific Antigen Screen   Date Value Ref Range Status   2022 0.37 0.00 - 4.00 ug/L Final       Reviewed orders with patient. Reviewed health maintenance and updated orders accordingly - Yes  BP Readings from Last 3 Encounters:   22 122/80   04/15/21 132/80   20 (!) 142/80    Wt Readings from Last 3 Encounters:   22 106.3 kg (234 lb 6.4 oz)   20 124.7 kg (275 lb)   19 118.2 kg (260 lb 9.6 oz)                    Reviewed and updated as needed this visit by clinical staff   Tobacco  Allergies  Meds  Problems  Med Hx  Surg Hx  Fam Hx            Reviewed and updated as needed this visit by Provider   Tobacco  Allergies  Meds  Problems  Med Hx  Surg Hx  Fam Hx               Review of Systems   Constitutional: Negative for chills and fever.   HENT: Negative for congestion, ear pain, hearing loss and sore throat.    Eyes: Negative for pain and visual disturbance.   Respiratory: Negative for cough and shortness of breath.    Cardiovascular: Negative for chest pain, palpitations and peripheral edema.   Gastrointestinal: Negative for abdominal pain, constipation, diarrhea, heartburn, hematochezia and nausea.   Genitourinary: Negative for dysuria, frequency, genital  "sores, hematuria, impotence, penile discharge and urgency.   Musculoskeletal: Positive for arthralgias and joint swelling. Negative for myalgias.   Skin: Negative for rash.   Neurological: Negative for dizziness, weakness, headaches and paresthesias.   Psychiatric/Behavioral: Negative for mood changes. The patient is not nervous/anxious.          OBJECTIVE:   /80   Pulse 77   Temp 96.9  F (36.1  C) (Temporal)   Resp 18   Ht 1.81 m (5' 11.26\")   Wt 106.3 kg (234 lb 6.4 oz)   BMI 32.45 kg/m      Physical Exam  GENERAL: healthy, alert and no distress  EYES: Eyes grossly normal to inspection, PERRL and conjunctivae and sclerae normal  HENT: ear canals and TM's normal, nose and mouth without ulcers or lesions  NECK: no adenopathy, no asymmetry, masses, or scars and thyroid normal to palpation  RESP: lungs clear to auscultation - no rales, rhonchi or wheezes  CV: regular rate and rhythm, normal S1 S2, no S3 or S4, no murmur, click or rub, no peripheral edema and peripheral pulses strong  ABDOMEN: Normal bowel sounds, soft, nontender.  Umbilical hernia is noted which is easily reducible.  No hepatosplenomegaly or masses noted.  MS: no gross musculoskeletal defects noted, no edema  SKIN: no suspicious lesions or rashes  NEURO: Normal strength and tone, mentation intact and speech normal  PSYCH: mentation appears normal, affect normal/bright    Diagnostic Test Results:  Labs reviewed in Epic    ASSESSMENT/PLAN:   (Z00.00) Routine general medical examination at a health care facility  (primary encounter diagnosis)  Comment: See counseling messages  Plan: Recheck in 1 year    (I10) Hypertension goal BP (blood pressure) < 140/90  Comment: Doing well. Well controlled. Tolerating medication.  No change in plan.    Plan: lisinopril (ZESTRIL) 40 MG tablet            (E78.5) Hyperlipidemia LDL goal <130  Comment: Doing well. Well controlled. Tolerating medication.  No change in plan.    Plan: simvastatin (ZOCOR) 20 MG " "tablet        Refills given    (K42.9) Umbilical hernia without obstruction and without gangrene  Comment: Patient with umbilical hernia.  This has been longstanding and no significant recent change.  He does have history of prior repair.  He would like to pursue having this repaired again.  Referral placed for general surgery.  Discussed symptoms of an incarcerated hernia and explained that this would be an emergency and would need to be evaluated in the emergency department.  Patient understands.  Plan: Adult General Surg Referral            (Z23) High priority for 2019-nCoV vaccine  Comment: Patient eligible for second booster.  Patient agrees to proceed.  Plan: COVID-19,PF,PFIZER (12+ Yrs GRAY LABEL)        Done      Patient has been advised of split billing requirements and indicates understanding: No    COUNSELING:   Reviewed preventive health counseling, as reflected in patient instructions       Regular exercise       Healthy diet/nutrition    Estimated body mass index is 32.45 kg/m  as calculated from the following:    Height as of this encounter: 1.81 m (5' 11.26\").    Weight as of this encounter: 106.3 kg (234 lb 6.4 oz).     Weight management plan: Discussed healthy diet and exercise guidelines    He reports that he quit smoking about 11 years ago. His smoking use included cigarettes. He has a 10.00 pack-year smoking history. He has never used smokeless tobacco.      Counseling Resources:  ATP IV Guidelines  Pooled Cohorts Equation Calculator  FRAX Risk Assessment  ICSI Preventive Guidelines  Dietary Guidelines for Americans, 2010  USDA's MyPlate  ASA Prophylaxis  Lung CA Screening    Annie Wall MD  [facility]  "

## 2022-04-14 ENCOUNTER — MYC MEDICAL ADVICE (OUTPATIENT)
Dept: FAMILY MEDICINE | Facility: CLINIC | Age: 62
End: 2022-04-14
Payer: COMMERCIAL

## 2022-04-14 DIAGNOSIS — E78.5 HYPERLIPIDEMIA LDL GOAL <130: Primary | ICD-10-CM

## 2022-04-14 DIAGNOSIS — I10 HYPERTENSION GOAL BP (BLOOD PRESSURE) < 140/90: ICD-10-CM

## 2022-04-14 DIAGNOSIS — Z12.5 SCREENING FOR PROSTATE CANCER: ICD-10-CM

## 2022-04-22 ENCOUNTER — OFFICE VISIT (OUTPATIENT)
Dept: FAMILY MEDICINE | Facility: CLINIC | Age: 62
End: 2022-04-22

## 2022-04-22 ENCOUNTER — LAB (OUTPATIENT)
Dept: LAB | Facility: CLINIC | Age: 62
End: 2022-04-22
Payer: COMMERCIAL

## 2022-04-22 VITALS
TEMPERATURE: 96.9 F | RESPIRATION RATE: 18 BRPM | BODY MASS INDEX: 32.81 KG/M2 | DIASTOLIC BLOOD PRESSURE: 80 MMHG | SYSTOLIC BLOOD PRESSURE: 122 MMHG | WEIGHT: 234.4 LBS | HEIGHT: 71 IN | HEART RATE: 77 BPM

## 2022-04-22 DIAGNOSIS — I10 HYPERTENSION GOAL BP (BLOOD PRESSURE) < 140/90: ICD-10-CM

## 2022-04-22 DIAGNOSIS — K42.9 UMBILICAL HERNIA WITHOUT OBSTRUCTION AND WITHOUT GANGRENE: ICD-10-CM

## 2022-04-22 DIAGNOSIS — Z00.00 ROUTINE GENERAL MEDICAL EXAMINATION AT A HEALTH CARE FACILITY: Primary | ICD-10-CM

## 2022-04-22 DIAGNOSIS — I10 HYPERTENSION GOAL BP (BLOOD PRESSURE) < 140/90: Chronic | ICD-10-CM

## 2022-04-22 DIAGNOSIS — E78.5 HYPERLIPIDEMIA LDL GOAL <130: ICD-10-CM

## 2022-04-22 DIAGNOSIS — Z12.5 SCREENING FOR PROSTATE CANCER: ICD-10-CM

## 2022-04-22 DIAGNOSIS — Z23 HIGH PRIORITY FOR 2019-NCOV VACCINE: ICD-10-CM

## 2022-04-22 LAB
ALBUMIN SERPL-MCNC: 4 G/DL (ref 3.4–5)
ALP SERPL-CCNC: 53 U/L (ref 40–150)
ALT SERPL W P-5'-P-CCNC: 29 U/L (ref 0–70)
ANION GAP SERPL CALCULATED.3IONS-SCNC: 6 MMOL/L (ref 3–14)
AST SERPL W P-5'-P-CCNC: 23 U/L (ref 0–45)
BILIRUB SERPL-MCNC: 0.5 MG/DL (ref 0.2–1.3)
BUN SERPL-MCNC: 28 MG/DL (ref 7–30)
CALCIUM SERPL-MCNC: 10.1 MG/DL (ref 8.5–10.1)
CHLORIDE BLD-SCNC: 103 MMOL/L (ref 94–109)
CHOLEST SERPL-MCNC: 147 MG/DL
CO2 SERPL-SCNC: 29 MMOL/L (ref 20–32)
CREAT SERPL-MCNC: 1.12 MG/DL (ref 0.66–1.25)
FASTING STATUS PATIENT QL REPORTED: YES
GFR SERPL CREATININE-BSD FRML MDRD: 76 ML/MIN/1.73M2
GLUCOSE BLD-MCNC: 114 MG/DL (ref 70–99)
HDLC SERPL-MCNC: 64 MG/DL
LDLC SERPL CALC-MCNC: 67 MG/DL
NONHDLC SERPL-MCNC: 83 MG/DL
POTASSIUM BLD-SCNC: 4.4 MMOL/L (ref 3.4–5.3)
PROT SERPL-MCNC: 7.5 G/DL (ref 6.8–8.8)
PSA SERPL-MCNC: 0.37 UG/L (ref 0–4)
SODIUM SERPL-SCNC: 138 MMOL/L (ref 133–144)
TRIGL SERPL-MCNC: 81 MG/DL

## 2022-04-22 PROCEDURE — 36415 COLL VENOUS BLD VENIPUNCTURE: CPT

## 2022-04-22 PROCEDURE — G0103 PSA SCREENING: HCPCS

## 2022-04-22 PROCEDURE — 80053 COMPREHEN METABOLIC PANEL: CPT

## 2022-04-22 PROCEDURE — 90715 TDAP VACCINE 7 YRS/> IM: CPT | Performed by: FAMILY MEDICINE

## 2022-04-22 PROCEDURE — 80061 LIPID PANEL: CPT

## 2022-04-22 PROCEDURE — 99214 OFFICE O/P EST MOD 30 MIN: CPT | Mod: 25 | Performed by: FAMILY MEDICINE

## 2022-04-22 PROCEDURE — 91305 COVID-19,PF,PFIZER (12+ YRS): CPT | Performed by: FAMILY MEDICINE

## 2022-04-22 PROCEDURE — 99396 PREV VISIT EST AGE 40-64: CPT | Mod: 25 | Performed by: FAMILY MEDICINE

## 2022-04-22 PROCEDURE — 0054A COVID-19,PF,PFIZER (12+ YRS): CPT | Performed by: FAMILY MEDICINE

## 2022-04-22 PROCEDURE — 90471 IMMUNIZATION ADMIN: CPT | Performed by: FAMILY MEDICINE

## 2022-04-22 ASSESSMENT — ENCOUNTER SYMPTOMS
PALPITATIONS: 0
HEMATOCHEZIA: 0
HEMATURIA: 0
ARTHRALGIAS: 1
EYE PAIN: 0
PARESTHESIAS: 0
SORE THROAT: 0
DIZZINESS: 0
CHILLS: 0
MYALGIAS: 0
COUGH: 0
NAUSEA: 0
NERVOUS/ANXIOUS: 0
FEVER: 0
SHORTNESS OF BREATH: 0
CONSTIPATION: 0
ABDOMINAL PAIN: 0
DIARRHEA: 0
FREQUENCY: 0
JOINT SWELLING: 1
HEARTBURN: 0
DYSURIA: 0
WEAKNESS: 0
HEADACHES: 0

## 2022-04-22 ASSESSMENT — PAIN SCALES - GENERAL: PAINLEVEL: NO PAIN (0)

## 2022-04-23 PROBLEM — E66.01 MORBID OBESITY (H): Status: RESOLVED | Noted: 2019-12-31 | Resolved: 2022-04-23

## 2022-04-23 RX ORDER — LISINOPRIL 40 MG/1
40 TABLET ORAL DAILY
Qty: 90 TABLET | Refills: 3 | Status: SHIPPED | OUTPATIENT
Start: 2022-04-23 | End: 2023-06-13

## 2022-04-23 RX ORDER — SIMVASTATIN 20 MG
20 TABLET ORAL AT BEDTIME
Qty: 90 TABLET | Refills: 3 | Status: SHIPPED | OUTPATIENT
Start: 2022-04-23 | End: 2023-06-13

## 2022-04-27 ENCOUNTER — OFFICE VISIT (OUTPATIENT)
Dept: SURGERY | Facility: CLINIC | Age: 62
End: 2022-04-27
Payer: COMMERCIAL

## 2022-04-27 VITALS
HEART RATE: 54 BPM | SYSTOLIC BLOOD PRESSURE: 117 MMHG | BODY MASS INDEX: 32.4 KG/M2 | DIASTOLIC BLOOD PRESSURE: 73 MMHG | WEIGHT: 234 LBS

## 2022-04-27 DIAGNOSIS — K42.9 UMBILICAL HERNIA WITHOUT OBSTRUCTION AND WITHOUT GANGRENE: ICD-10-CM

## 2022-04-27 DIAGNOSIS — K42.9 RECURRENT UMBILICAL HERNIA: Primary | ICD-10-CM

## 2022-04-27 PROCEDURE — 99203 OFFICE O/P NEW LOW 30 MIN: CPT | Performed by: SURGERY

## 2022-04-27 NOTE — PROGRESS NOTES
Patient seen in consultation for recurrent umbilical hernia by Annie Wall    HPI:  Patient is a 61 year old male  with complaints of umbilical hernia  Had previous repair in 2004, had few years prior to that  The patient noticed the recurrence about 5 years ago.   Not changing and not really bothersome but wanted to look into repair again  Working on weight loss, down about 45 lbs so far, goal another 30 to go  So mostly notices when doing core work  Patient has family history of hernia problems in father    Review Of Systems    Skin: negative  Ears/Nose/Throat: negative  Respiratory: No shortness of breath, dyspnea on exertion, cough, or hemoptysis  Cardiovascular: negative  Gastrointestinal: negative  Genitourinary: negative  Musculoskeletal: as above  Neurologic: negative  Hematologic/Lymphatic/Immunologic: negative  Endocrine: negative      Past Medical History:   Diagnosis Date     Essential hypertension, benign      Lateral epicondylitis      Mixed hyperlipidemia      Obesity, unspecified 4/5/2006    lost 50#     Sleep apnea     has CPAP       Past Surgical History:   Procedure Laterality Date     BIOPSY  2014    benign cysts on left forearm     COLONOSCOPY  9/20/2013    Procedure: COLONOSCOPY;  Colonscopy/Screen for colon cancer  [facility]  PKT sent 8/14/13 [facility];  Surgeon: Annie Wall MD;  Location: [facility] OR     HERNIA REPAIR, UMBILICAL  08/06/2004    with mesh     LASIK  2003,2004    had revisions x 2     ORTHOPEDIC SURGERY  6/9/2016    Right Hip replacement     REMOVAL OF SPERM DUCT(S)  2000   carpal tunnel release bilaterally    Social History     Socioeconomic History     Marital status:      Spouse name: Yuni     Number of children: 3     Years of education: 16     Highest education level: Not on file   Occupational History     Occupation:    Tobacco Use     Smoking status: Former Smoker     Packs/day: 0.50     Years: 20.00     Pack years: 10.00     Types:  Cigarettes     Quit date: 10/20/2010     Years since quittin.5     Smokeless tobacco: Never Used   Substance and Sexual Activity     Alcohol use: Yes     Alcohol/week: 0.8 standard drinks     Comment: occasionally 1-2 drinks per week     Drug use: No     Sexual activity: Yes     Partners: Female     Birth control/protection: Surgical     Comment: vasectomy   Other Topics Concern      Service No     Blood Transfusions No     Caffeine Concern Yes     Occupational Exposure No     Hobby Hazards No     Sleep Concern No     Stress Concern No     Weight Concern Yes     Special Diet No     Comment: portion     Back Care No     Exercise Yes     Comment: trying to get back into an exercise program     Bike Helmet Yes     Seat Belt Yes     Self-Exams No     Parent/sibling w/ CABG, MI or angioplasty before 65F 55M? No   Social History Narrative     Not on file     Social Determinants of Health     Financial Resource Strain: Not on file   Food Insecurity: Not on file   Transportation Needs: Not on file   Physical Activity: Not on file   Stress: Not on file   Social Connections: Not on file   Intimate Partner Violence: Not on file   Housing Stability: Not on file       Current Outpatient Medications   Medication Sig Dispense Refill     fish oil-omega-3 fatty acids 1000 MG capsule        Ibuprofen (ADVIL PO) Take 400 mg by mouth as needed       lisinopril (ZESTRIL) 40 MG tablet Take 1 tablet (40 mg) by mouth daily 90 tablet 3     magnesium citrate solution Take 296 mLs by mouth once       simvastatin (ZOCOR) 20 MG tablet Take 1 tablet (20 mg) by mouth At Bedtime 90 tablet 3     Turmeric (CURCUMIN 95 PO)          Medications and history reviewed    Physical exam:  Vitals: /73   Pulse 54   Wt 106.1 kg (234 lb)   BMI 32.40 kg/m    BMI= Body mass index is 32.4 kg/m .    Constitutional: healthy, alert and no distress  Head: Normocephalic. No masses, lesions, tenderness or abnormalities  Cardiovascular: negative,  PMI normal. No lifts, heaves, or thrills. RRR. No murmurs, clicks gallops or rub  Respiratory: negative, Percussion normal. Good diaphragmatic excursion. Lungs clear  Gastrointestinal: Abdomen soft, non-tender. BS normal. No masses, organomegaly, positive findings: reducible umbilical hernia, sac 4-5 cm, defect about 3 maybe 4 cm  : Deferred  Musculoskeletal: extremities normal- no gross deformities noted, gait normal and normal muscle tone  Skin: no suspicious lesions or rashes  Psychiatric: mentation appears normal and affect normal/bright      Assessment:     ICD-10-CM    1. Recurrent umbilical hernia  K42.9      Plan: Recurrent umbilical hernia but basically asymptomatic.  Discussed that I would approach repair robotically in order to better access inside the abdomen for removal of any also mesh which she suspects was there but is unsure, and then proceed with defect closure and new mesh placement.  Right now patient would like to think about getting scheduled as he has some other things going on his life right now.  He thinks it is likely he will pursue repair and so we will reach out when he has a better idea of when.  Card provided for contact information.  Follow-up as needed.  If noticing that the hernia is increasingly painful or no longer reducible, present to ER for evaluation.    Gurjit Perez MD

## 2022-04-27 NOTE — LETTER
4/27/2022         RE: Henrry Cortes  [address] [place] [address]        Dear Colleague,    Thank you for referring your patient, Henrry Cortes, to the [facility]. Please see a copy of my visit note below.    Patient seen in consultation for recurrent umbilical hernia by Annie Wall    HPI:  Patient is a 61 year old male  with complaints of umbilical hernia  Had previous repair in 2004, had few years prior to that  The patient noticed the recurrence about 5 years ago.   Not changing and not really bothersome but wanted to look into repair again  Working on weight loss, down about 45 lbs so far, goal another 30 to go  So mostly notices when doing core work  Patient has family history of hernia problems in father    Review Of Systems    Skin: negative  Ears/Nose/Throat: negative  Respiratory: No shortness of breath, dyspnea on exertion, cough, or hemoptysis  Cardiovascular: negative  Gastrointestinal: negative  Genitourinary: negative  Musculoskeletal: as above  Neurologic: negative  Hematologic/Lymphatic/Immunologic: negative  Endocrine: negative      Past Medical History:   Diagnosis Date     Essential hypertension, benign      Lateral epicondylitis      Mixed hyperlipidemia      Obesity, unspecified 4/5/2006    lost 50#     Sleep apnea     has CPAP       Past Surgical History:   Procedure Laterality Date     BIOPSY  2014    benign cysts on left forearm     COLONOSCOPY  9/20/2013    Procedure: COLONOSCOPY;  Colonscopy/Screen for colon cancer  [facility]  PKT sent 8/14/13 [facility];  Surgeon: Annie Wall MD;  Location: [facility] OR     HERNIA REPAIR, UMBILICAL  08/06/2004    with mesh     LASIK  2003,2004    had revisions x 2     ORTHOPEDIC SURGERY  6/9/2016    Right Hip replacement     REMOVAL OF SPERM DUCT(S)  2000   carpal tunnel release bilaterally    Social History     Socioeconomic History     Marital status:      Spouse name: Yuni     Number of  children: 3     Years of education: 16     Highest education level: Not on file   Occupational History     Occupation:    Tobacco Use     Smoking status: Former Smoker     Packs/day: 0.50     Years: 20.00     Pack years: 10.00     Types: Cigarettes     Quit date: 10/20/2010     Years since quittin.5     Smokeless tobacco: Never Used   Substance and Sexual Activity     Alcohol use: Yes     Alcohol/week: 0.8 standard drinks     Comment: occasionally 1-2 drinks per week     Drug use: No     Sexual activity: Yes     Partners: Female     Birth control/protection: Surgical     Comment: vasectomy   Other Topics Concern      Service No     Blood Transfusions No     Caffeine Concern Yes     Occupational Exposure No     Hobby Hazards No     Sleep Concern No     Stress Concern No     Weight Concern Yes     Special Diet No     Comment: portion     Back Care No     Exercise Yes     Comment: trying to get back into an exercise program     Bike Helmet Yes     Seat Belt Yes     Self-Exams No     Parent/sibling w/ CABG, MI or angioplasty before 65F 55M? No   Social History Narrative     Not on file     Social Determinants of Health     Financial Resource Strain: Not on file   Food Insecurity: Not on file   Transportation Needs: Not on file   Physical Activity: Not on file   Stress: Not on file   Social Connections: Not on file   Intimate Partner Violence: Not on file   Housing Stability: Not on file       Current Outpatient Medications   Medication Sig Dispense Refill     fish oil-omega-3 fatty acids 1000 MG capsule        Ibuprofen (ADVIL PO) Take 400 mg by mouth as needed       lisinopril (ZESTRIL) 40 MG tablet Take 1 tablet (40 mg) by mouth daily 90 tablet 3     magnesium citrate solution Take 296 mLs by mouth once       simvastatin (ZOCOR) 20 MG tablet Take 1 tablet (20 mg) by mouth At Bedtime 90 tablet 3     Turmeric (CURCUMIN 95 PO)          Medications and history reviewed    Physical  exam:  Vitals: /73   Pulse 54   Wt 106.1 kg (234 lb)   BMI 32.40 kg/m    BMI= Body mass index is 32.4 kg/m .    Constitutional: healthy, alert and no distress  Head: Normocephalic. No masses, lesions, tenderness or abnormalities  Cardiovascular: negative, PMI normal. No lifts, heaves, or thrills. RRR. No murmurs, clicks gallops or rub  Respiratory: negative, Percussion normal. Good diaphragmatic excursion. Lungs clear  Gastrointestinal: Abdomen soft, non-tender. BS normal. No masses, organomegaly, positive findings: reducible umbilical hernia, sac 4-5 cm, defect about 3 maybe 4 cm  : Deferred  Musculoskeletal: extremities normal- no gross deformities noted, gait normal and normal muscle tone  Skin: no suspicious lesions or rashes  Psychiatric: mentation appears normal and affect normal/bright      Assessment:     ICD-10-CM    1. Recurrent umbilical hernia  K42.9      Plan: Recurrent umbilical hernia but basically asymptomatic.  Discussed that I would approach repair robotically in order to better access inside the abdomen for removal of any also mesh which she suspects was there but is unsure, and then proceed with defect closure and new mesh placement.  Right now patient would like to think about getting scheduled as he has some other things going on his life right now.  He thinks it is likely he will pursue repair and so we will reach out when he has a better idea of when.  Card provided for contact information.  Follow-up as needed.  If noticing that the hernia is increasingly painful or no longer reducible, present to ER for evaluation.    Gurjit Perez MD        Again, thank you for allowing me to participate in the care of your patient.        Sincerely,        Gurjit Perez MD

## 2022-06-13 ENCOUNTER — TELEPHONE (OUTPATIENT)
Dept: SURGERY | Facility: CLINIC | Age: 62
End: 2022-06-13

## 2022-06-13 NOTE — TELEPHONE ENCOUNTER
Type of surgery: robotic assisted laparoscopic recurrent umbilical hernia repair possible removal or old mesh possible open     CPT 05243 , 14437   Recurrent umbilical hernia K42.9     Umbilical hernia without obstruction and without gangrene K42.9    Location of surgery: [facility]  Date and time of surgery: 07/14/2022  Surgeon: Juancho  Pre-Op Appt Date: 06/28/2022  Post-Op Appt Date: 07/27/2022   Packet sent out: Yes  Pre-cert/Authorization completed:  No prior auth needed per [employer] tool.    Date: 6-14-22    Rupinder Louis  Financial Securing/Prior Auth Dept  [phone]

## 2022-06-28 ENCOUNTER — OFFICE VISIT (OUTPATIENT)
Dept: FAMILY MEDICINE | Facility: CLINIC | Age: 62
End: 2022-06-28
Payer: COMMERCIAL

## 2022-06-28 ENCOUNTER — MYC MEDICAL ADVICE (OUTPATIENT)
Dept: FAMILY MEDICINE | Facility: OTHER | Age: 62
End: 2022-06-28

## 2022-06-28 ENCOUNTER — TELEPHONE (OUTPATIENT)
Dept: FAMILY MEDICINE | Facility: OTHER | Age: 62
End: 2022-06-28

## 2022-06-28 VITALS
HEART RATE: 50 BPM | RESPIRATION RATE: 17 BRPM | OXYGEN SATURATION: 97 % | WEIGHT: 221 LBS | BODY MASS INDEX: 30.94 KG/M2 | HEIGHT: 71 IN | SYSTOLIC BLOOD PRESSURE: 104 MMHG | TEMPERATURE: 97.2 F | DIASTOLIC BLOOD PRESSURE: 68 MMHG

## 2022-06-28 DIAGNOSIS — Z01.818 PREOP GENERAL PHYSICAL EXAM: Primary | ICD-10-CM

## 2022-06-28 DIAGNOSIS — K42.9 UMBILICAL HERNIA WITHOUT OBSTRUCTION AND WITHOUT GANGRENE: ICD-10-CM

## 2022-06-28 DIAGNOSIS — R73.01 IMPAIRED FASTING GLUCOSE: ICD-10-CM

## 2022-06-28 DIAGNOSIS — G47.33 OSA (OBSTRUCTIVE SLEEP APNEA): ICD-10-CM

## 2022-06-28 DIAGNOSIS — Z13.0 SCREENING FOR DEFICIENCY ANEMIA: ICD-10-CM

## 2022-06-28 DIAGNOSIS — I10 HYPERTENSION GOAL BP (BLOOD PRESSURE) < 140/90: ICD-10-CM

## 2022-06-28 DIAGNOSIS — E78.5 HYPERLIPIDEMIA LDL GOAL <130: ICD-10-CM

## 2022-06-28 LAB
ERYTHROCYTE [DISTWIDTH] IN BLOOD BY AUTOMATED COUNT: 13.7 % (ref 10–15)
HCT VFR BLD AUTO: 45.7 % (ref 40–53)
HGB BLD-MCNC: 15.6 G/DL (ref 13.3–17.7)
MCH RBC QN AUTO: 30.6 PG (ref 26.5–33)
MCHC RBC AUTO-ENTMCNC: 34.1 G/DL (ref 31.5–36.5)
MCV RBC AUTO: 90 FL (ref 78–100)
PLATELET # BLD AUTO: 229 10E3/UL (ref 150–450)
POTASSIUM BLD-SCNC: 4.7 MMOL/L (ref 3.4–5.3)
RBC # BLD AUTO: 5.09 10E6/UL (ref 4.4–5.9)
WBC # BLD AUTO: 7.4 10E3/UL (ref 4–11)

## 2022-06-28 PROCEDURE — 85027 COMPLETE CBC AUTOMATED: CPT | Performed by: PHYSICIAN ASSISTANT

## 2022-06-28 PROCEDURE — 99214 OFFICE O/P EST MOD 30 MIN: CPT | Performed by: PHYSICIAN ASSISTANT

## 2022-06-28 PROCEDURE — 36415 COLL VENOUS BLD VENIPUNCTURE: CPT | Performed by: PHYSICIAN ASSISTANT

## 2022-06-28 PROCEDURE — 84132 ASSAY OF SERUM POTASSIUM: CPT | Performed by: PHYSICIAN ASSISTANT

## 2022-06-28 ASSESSMENT — PAIN SCALES - GENERAL: PAINLEVEL: NO PAIN (0)

## 2022-06-28 NOTE — PROGRESS NOTES
[facility]  [address] [facility] [address], [address]  [place] [address]  Phone: [phone]  Fax: [phone]  Primary Provider: Annie Wall  Pre-op Performing Provider: CAIO TSE      PREOPERATIVE EVALUATION:  Today's date: 6/28/2022    Henrry Cortes is a 61 year old male who presents for a preoperative evaluation.    Surgical Information:  Surgery/Procedure: Umbilical Hernia Repair  Surgery Location: [facility]  Surgeon: Ana  Surgery Date: 7/14/2022  Time of Surgery: 8:30am  Where patient plans to recover: At home with family  Fax number for surgical facility: Note does not need to be faxed, will be available electronically in Epic.    Type of Anesthesia Anticipated: General    Assessment & Plan     The proposed surgical procedure is considered INTERMEDIATE risk.    Preop general physical exam  Scheduled for hernia repair as above    Umbilical hernia without obstruction and without gangrene  Scheduled for procedure as above    Hypertension goal BP (blood pressure) < 140/90  At goal, may need to reduce dosage of the lisinopril if he continues to lose weight, he has lost 54 pounds in 6 months with diet and exercise modifications  - Potassium; Future  - Potassium  He will continue to monitor his blood pressures and alert PCP if he is having any symptoms of low blood pressure which we reviewed today    Hyperlipidemia LDL goal <130  Currently being treated with simvastatin    DELANEY (obstructive sleep apnea)-Mild (AHI 8)  Not treated, but with his weight loss he has stopped using his CPAP machine please observe for desats with anesthesia    Impaired fasting glucose  Likely improved with his weight loss, he will recheck with PCP as needed    Screening for deficiency anemia    - CBC with platelets; Future  - CBC with platelets         Risks and Recommendations:  The patient has the following additional risks and recommendations for perioperative complications:  Obstructive Sleep  Apnea:   Not using his CPAP currently since his 54 pound weight loss    Medication Instructions:  Patient is to take all scheduled medications on the day of surgery EXCEPT for modifications listed below:  Stop fish oil and all supplements 2 weeks prior to procedure   - ACE/ARB: HOLD due to exceptional risk of hypotension during surgery.     RECOMMENDATION:  APPROVAL GIVEN to proceed with proposed procedure, without further diagnostic evaluation.      Subjective     HPI related to upcoming procedure: Patient has had an umbilical hernia many years ago.  It was previously repaired in 2004.  He has noticed that it has come back again and had not been causing him any issues except for within the last few weeks he had 2 different episodes where the popped out and he had to reduce it.  Once it was reduced the discomfort had resolved.     Preop Questions 6/27/2022   1. Have you ever had a heart attack or stroke? No   2. Have you ever had surgery on your heart or blood vessels, such as a stent placement, a coronary artery bypass, or surgery on an artery in your head, neck, heart, or legs? No   3. Do you have chest pain with activity? No   4. Do you have a history of  heart failure? No   5. Do you currently have a cold, bronchitis or symptoms of other infection? No   6. Do you have a cough, shortness of breath, or wheezing? No   7. Do you or anyone in your family have previous history of blood clots? No   8. Do you or does anyone in your family have a serious bleeding problem such as prolonged bleeding following surgeries or cuts? No   9. Have you ever had problems with anemia or been told to take iron pills? No   10. Have you had any abnormal blood loss such as black, tarry or bloody stools? No   11. Have you ever had a blood transfusion? No   12. Are you willing to have a blood transfusion if it is medically needed before, during, or after your surgery? Yes   13. Have you or any of your relatives ever had problems with  anesthesia? No   14. Do you have sleep apnea, excessive snoring or daytime drowsiness? No   15. Do you have any artifical heart valves or other implanted medical devices like a pacemaker, defibrillator, or continuous glucose monitor? No   16. Do you have artificial joints? YES - right hip   17. Are you allergic to latex? No       Health Care Directive:  Patient has a Health Care Directive on file      Preoperative Review of :   reviewed - no record of controlled substances prescribed.      Status of Chronic Conditions:  HYPERLIPIDEMIA - Patient has a long history of significant Hyperlipidemia requiring medication for treatment with recent good control. Patient reports no problems or side effects with the medication.     HYPERTENSION - Patient has longstanding history of HTN , currently denies any symptoms referable to elevated blood pressure. Specifically denies chest pain, palpitations, dyspnea, orthopnea, PND or peripheral edema. Blood pressure readings have been in normal range. Current medication regimen is as listed below. Patient denies any side effects of medication.       Review of Systems  CONSTITUTIONAL: NEGATIVE for fever, chills, change in weight  CONSTITUTIONAL: He has been working very hard with diet and exercise in order to lose weight.  He has lost 54 pounds since December.    INTEGUMENTARY/SKIN: NEGATIVE for worrisome rashes, moles or lesions  EYES: NEGATIVE for vision changes or irritation  ENT/MOUTH: NEGATIVE for ear, mouth and throat problems  RESP: NEGATIVE for significant cough or SOB  CV: NEGATIVE for chest pain, palpitations or peripheral edema  GI: NEGATIVE for nausea, abdominal pain, heartburn, or change in bowel habits  GI: A few episodes of tenderness  related to his hernia today  : NEGATIVE for frequency, dysuria, or hematuria  MUSCULOSKELETAL: NEGATIVE for significant arthralgias or myalgia  NEURO: NEGATIVE for weakness, dizziness or paresthesias  ENDOCRINE: NEGATIVE for  "temperature intolerance, skin/hair changes  HEME: NEGATIVE for bleeding problems  PSYCHIATRIC: NEGATIVE for changes in mood or affect    Patient Active Problem List    Diagnosis Date Noted     Umbilical hernia without obstruction and without gangrene 06/09/2017     Priority: Medium     Family history of bicuspid cardiac valve 08/11/2016     Priority: Medium     Family history of disease of aorta 08/11/2016     Priority: Medium     DELANEY (obstructive sleep apnea)-Mild (AHI 8) 06/21/2012     Priority: Medium     Indications for Polysomnogram 6/19/2012: The patient is a 51 y year old Male who is 5' 11\" and weighs 214.0 lbs.  His BMI equals 30.0, Haugan sleepiness scale 11.0 and neck size is 17\".  A full night polysomnogram was performed to evaluate for snoring and sleepiness.    Polysomnogram Data:  A full night polysomnogram recorded the standard physiologic parameters including EEG, EOG, EMG, EKG, nasal and oral airflow.  Respiratory parameters of chest and abdominal movements were recorded with respiratory inductance plethysmography.  Oxygen saturation was recorded by pulse oximetry.      Sleep Architecture:  The total recording time of the polysomnogram was 416.2 minutes.  The total sleep time was 382.0 minutes.  Sleep latency was 3.8 minutes.  REM latency was 38.5 minutes.  Arousal Index was 27.3.  Sleep Efficiency was 91.8%.  Wake after sleep onset was 31.0.  The patient spent 3.1% of total sleep time in Stage N1, 49.0% in Stage N2, 16.6% in Stages N3, and 31.3% in REM.       Respiration:      Sustained Sleep Associated Hypoventilation -was not monitored.    Sleep Associated Hypoxemia - was present.  Baseline oxygen saturation was 94.7%.  Snoring was reported as loud.  Lowest oxygen saturation was 87.0%    Events - The polysomnogram revealed a presence of 12 obstructive, no central, and no mixed apneas resulting in an Apnea index of 1.9 events per hour.  There were 40 hypopneas resulting in a Hypopnea index of 6.3 " events per hour.  The combined Apnea/Hypopnea Index was 8.2 events per hour.  The REM AHI is 4.0.  The supine AHI is 18.8.  The RERA index was 10.4 per hour.   The RDI was  .    Movement Activity:      Limb Notes - There were 265 limb movements recorded.  Of this total, 223 were classified as PLMs.  Of the PLMs, 7 were associated with arousals.  The Limb Movement index was 41.6 per hour while the PLM index was 35.0per hour.    Behavior - none    Bruxism - none    Seizures - none    Cardiac Summary:   The average pulse rate was 49.6 bpm.  The minimum pulse rate was 38.9 bpm while the maximum pulse rate was 80.5 bpm.    Assessment:     Mild DELANEY (AHI 8)    Recommendations:    Options include a dental appliance if dentition allows, auto-titrating CPAP, ENT evaluation for surgery in carefully selected patients. Clinical correlation advised.     Advise regarding the risks of drowsy driving    Suggest optimizing sleep hygiene and avoiding sleep deprivation    Weight management    Dopaminergic therapy should be used for management of restless legs syndrome and not based on the presence of periodic limb movements alone       Impaired fasting glucose 03/06/2012     Priority: Medium     Snoring 03/06/2012     Priority: Medium     Plantar fasciitis 03/06/2012     Priority: Medium     Hypertension goal BP (blood pressure) < 140/90 03/06/2012     Priority: Medium     HYPERLIPIDEMIA LDL GOAL <130 10/31/2010     Priority: Medium      Past Medical History:   Diagnosis Date     Essential hypertension, benign      Lateral epicondylitis      Mixed hyperlipidemia      Obesity, unspecified 4/5/2006    lost 50#     Sleep apnea     has CPAP     Past Surgical History:   Procedure Laterality Date     BIOPSY  2014    benign cysts on left forearm     COLONOSCOPY  09/20/2013    Procedure: COLONOSCOPY;  Colonscopy/Screen for colon cancer  [facility] sent [facility]/14/13 [facility];  Surgeon: Annie Wall MD;  Location: [facility] OR     HERNIA REPAIR,  "UMBILICAL  2004    with mesh     LASIK  ,    had revisions x 2     ORTHOPEDIC SURGERY  2016    Right Hip replacement     REMOVAL OF SPERM DUCT(S)       SOFT TISSUE SURGERY  Oct 2019, 2020    Carpul Tunnel release, both hands     Current Outpatient Medications   Medication Sig Dispense Refill     COLLAGEN PO        fish oil-omega-3 fatty acids 1000 MG capsule        Ibuprofen (ADVIL PO) Take 400 mg by mouth as needed       lisinopril (ZESTRIL) 40 MG tablet Take 1 tablet (40 mg) by mouth daily 90 tablet 3     LIVER EXTRACT PO        magnesium citrate solution Take 296 mLs by mouth once       simvastatin (ZOCOR) 20 MG tablet Take 1 tablet (20 mg) by mouth At Bedtime 90 tablet 3       Allergies   Allergen Reactions     No Known Drug Allergies         Social History     Tobacco Use     Smoking status: Former Smoker     Packs/day: 0.50     Years: 20.00     Pack years: 10.00     Types: Cigarettes     Quit date: 10/20/2010     Years since quittin.6     Smokeless tobacco: Never Used   Substance Use Topics     Alcohol use: Not Currently     Alcohol/week: 0.8 standard drinks     Comment: occasionally 1-2 drinks per week       History   Drug Use No         Objective     /68 (BP Location: Left arm, Patient Position: Chair, Cuff Size: Adult Large)   Pulse 50   Temp 97.2  F (36.2  C) (Temporal)   Resp 17   Ht 1.81 m (5' 11.25\")   Wt 100.2 kg (221 lb)   SpO2 97%   BMI 30.61 kg/m      Physical Exam    GENERAL APPEARANCE: healthy, alert and no distress     EYES: EOMI,  PERRL     HENT: ear canals and TM's normal and nose and mouth without ulcers or lesions     NECK: no adenopathy, no asymmetry, masses, or scars and thyroid normal to palpation     RESP: lungs clear to auscultation - no rales, rhonchi or wheezes     CV: regular rates and rhythm, normal S1 S2, no S3 or S4 and no murmur, click or rub     ABDOMEN:  soft, nontender, no HSM or masses and bowel sounds normal     ABDOMEN: Umbilical " hernia currently reduced     MS: extremities normal- no gross deformities noted, no evidence of inflammation in joints, FROM in all extremities.     SKIN: no suspicious lesions or rashes     NEURO: Normal strength and tone, sensory exam grossly normal, mentation intact and speech normal     PSYCH: mentation appears normal. and affect normal/bright     LYMPHATICS: No cervical adenopathy    Recent Labs   Lab Test 04/22/22  0931 12/01/20  1107    140   POTASSIUM 4.4 4.3   CR 1.12 0.96        Diagnostics:  Labs pending at this time.  Results will be reviewed when available.  Recent Results (from the past 24 hour(s))   Potassium    Collection Time: 06/28/22  8:18 AM   Result Value Ref Range    Potassium 4.7 3.4 - 5.3 mmol/L   CBC with platelets    Collection Time: 06/28/22  8:18 AM   Result Value Ref Range    WBC Count 7.4 4.0 - 11.0 10e3/uL    RBC Count 5.09 4.40 - 5.90 10e6/uL    Hemoglobin 15.6 13.3 - 17.7 g/dL    Hematocrit 45.7 40.0 - 53.0 %    MCV 90 78 - 100 fL    MCH 30.6 26.5 - 33.0 pg    MCHC 34.1 31.5 - 36.5 g/dL    RDW 13.7 10.0 - 15.0 %    Platelet Count 229 150 - 450 10e3/uL      No EKG required, no history of coronary heart disease, significant arrhythmia, peripheral arterial disease or other structural heart disease.        We will contact the surgical center for this information.  Surgery center states he does not need covid testing since he is fully vaccinated.     Revised Cardiac Risk Index (RCRI):  The patient has the following serious cardiovascular risks for perioperative complications:   - No serious cardiac risks = 0 points     RCRI Interpretation: 0 points: Class I (very low risk - 0.4% complication rate)           Signed Electronically by: Sury Galo PA-C  Copy of this evaluation report is provided to requesting physician.

## 2022-06-28 NOTE — TELEPHONE ENCOUNTER
Please call [facility], preop center-I did a preop on patient this morning he is having surgery July 14, he was not told he needed a pre-op covid test.  Is [facility]  still requiring preop COVID test?  Please let myself and pt know.  I can order it if needed  Sury Galo PA-C

## 2022-06-28 NOTE — PATIENT INSTRUCTIONS
Preparing for Your Surgery  Getting started  A nurse will call you to review your health history and instructions. They will give you an arrival time based on your scheduled surgery time. Please be ready to share:    Your doctor's clinic name and phone number    Your medical, surgical and anesthesia history    A list of allergies and sensitivities    A list of medicines, including herbal treatments and over-the-counter drugs    Whether the patient has a legal guardian (ask how to send us the papers in advance)  Please tell us if you're pregnant--or if there's any chance you might be pregnant. Some surgeries may injure a fetus (unborn baby), so they require a pregnancy test. Surgeries that are safe for a fetus don't always need a test, and you can choose whether to have one.   If you have a child who's having surgery, please ask for a copy of Preparing for Your Child's Surgery.    Preparing for surgery    Within 30 days of surgery: Have a pre-op exam (sometimes called an H&P, or History and Physical). This can be done at a clinic or pre-operative center.  ? If you're having a , you may not need this exam. Talk to your care team.    At your pre-op exam, talk to your care team about all medicines you take. If you need to stop any medicines before surgery, ask when to start taking them again.  ? We do this for your safety. Many medicines can make you bleed too much during surgery. Some change how well surgery (anesthesia) drugs work.    Call your insurance company to let them know you're having surgery. (If you don't have insurance, call [phone].)    Call your clinic if there's any change in your health. This includes signs of a cold or flu (sore throat, runny nose, cough, rash, fever). It also includes a scrape or scratch near the surgery site.    If you have questions on the day of surgery, call your hospital or surgery center.  COVID testing  You may need to be tested for COVID-19 before having  surgery. If so, we will give you instructions.  Eating and drinking guidelines  For your safety: Unless your surgeon tells you otherwise, follow the guidelines below.    Eat and drink as usual until 8 hours before surgery. After that, no food or milk.    Drink clear liquids until 2 hours before surgery. These are liquids you can see through, like water, Gatorade and Propel Water. You may also have black coffee and tea (no cream or milk).    Nothing by mouth within 2 hours of surgery. This includes gum, candy and breath mints.    If you drink alcohol: Stop drinking it the night before surgery.    If your care team tells you to take medicine on the morning of surgery, it's okay to take it with a sip of water.  Preventing infection    Shower or bathe the night before and morning of your surgery. Follow the instructions your clinic gave you. (If no instructions, use regular soap.)    Don't shave or clip hair near your surgery site. We'll remove the hair if needed.    Don't smoke or vape the morning of surgery. You may chew nicotine gum up to 2 hours before surgery. A nicotine patch is okay.  ? Note: Some surgeries require you to completely quit smoking and nicotine. Check with your surgeon.    Your care team will make every effort to keep you safe from infection. We will:  ? Clean our hands often with soap and water (or an alcohol-based hand rub).  ? Clean the skin at your surgery site with a special soap that kills germs.  ? Give you a special gown to keep you warm. (Cold raises the risk of infection.)  ? Wear special hair covers, masks, gowns and gloves during surgery.  ? Give antibiotic medicine, if prescribed. Not all surgeries need antibiotics.  What to bring on the day of surgery    Photo ID and insurance card    Copy of your health care directive, if you have one    Glasses and hearing aides (bring cases)  ? You can't wear contacts during surgery    Inhaler and eye drops, if you use them (tell us about these when  you arrive)    CPAP machine or breathing device, if you use them    A few personal items, if spending the night    If you have . . .  ? A pacemaker, ICD (cardiac defibrillator) or other implant: Bring the ID card.  ? An implanted stimulator: Bring the remote control.  ? A legal guardian: Bring a copy of the certified (court-stamped) guardianship papers.  Please remove any jewelry, including body piercings. Leave jewelry and other valuables at home.  If you're going home the day of surgery    You must have a responsible adult drive you home. They should stay with you overnight as well.    If you don't have someone to stay with you, and you aren't safe to go home alone, we may keep you overnight. Insurance often won't pay for this.  After surgery  If it's hard to control your pain or you need more pain medicine, please call your surgeon's office.  Questions?   If you have any questions for your care team, list them here: _________________________________________________________________________________________________________________________________________________________________________ ____________________________________ ____________________________________ ____________________________________  For informational purposes only. Not to replace the advice of your health care provider. Copyright   2003, 2019 [facility]. All rights reserved. Clinically reviewed by Reena Reynolds MD. Pica8 [id number] - REV 07/21.

## 2022-06-28 NOTE — TELEPHONE ENCOUNTER
Call returned from surgical team.      Patient does not need covid test as he is fully vaccinated.    Stephanie Garcia XRO/

## 2022-07-27 ENCOUNTER — OFFICE VISIT (OUTPATIENT)
Dept: SURGERY | Facility: CLINIC | Age: 62
End: 2022-07-27
Payer: COMMERCIAL

## 2022-07-27 VITALS
HEART RATE: 50 BPM | SYSTOLIC BLOOD PRESSURE: 104 MMHG | DIASTOLIC BLOOD PRESSURE: 68 MMHG | WEIGHT: 221 LBS | BODY MASS INDEX: 30.61 KG/M2

## 2022-07-27 DIAGNOSIS — Z87.19 S/P REPAIR OF RECURRENT VENTRAL HERNIA: Primary | ICD-10-CM

## 2022-07-27 DIAGNOSIS — Z98.890 S/P REPAIR OF RECURRENT VENTRAL HERNIA: Primary | ICD-10-CM

## 2022-07-27 PROCEDURE — 99024 POSTOP FOLLOW-UP VISIT: CPT | Performed by: SURGERY

## 2022-07-27 NOTE — LETTER
7/27/2022         RE: Henrry Cortes  [address] [place] [address]        Dear Colleague,    Thank you for referring your patient, Henrry Cortes, to the [facility]. Please see a copy of my visit note below.    General Surgery Post Op    Pt returns for follow up visit s/p robotic etep-retrorectus recurrent ventral hernia repair on 7/14/2022.    Patient has been doing well, tolerating diet. Bowels moving well. Pain controlled. No issues with wound healing/redness/drainage. No fevers.  Went back to the gym day 2 after surgery to start doing some easy cardio and has actually been able to increase back to his normal workout.  Has only done a little bit of light weights more recently but things have been feeling good.    Physical exam: Vitals: /68   Pulse 50   Wt 100.2 kg (221 lb)   BMI 30.61 kg/m    BMI= Body mass index is 30.61 kg/m .    Exam:  Constitutional: healthy, alert and no distress  Gastrointestinal: Abdomen soft, non-tender. BS normal. No masses, organomegaly  Incisions healing well no signs of infection.  No palpable hernia recurrence or other abnormality in central abdomen.      Assessment:     ICD-10-CM    1. S/P repair of recurrent ventral hernia  Z98.890     Z87.19      Plan: Recovering well from the procedure.  Okay to start increasing his activities as tolerated including weights.  Use of the abdominal binder is up to him for comfort.  Follow-up with me as needed.    Gurjit Perez MD          Again, thank you for allowing me to participate in the care of your patient.        Sincerely,        Gurjit Perez MD

## 2022-07-27 NOTE — PROGRESS NOTES
General Surgery Post Op    Pt returns for follow up visit s/p robotic etep-retrorectus recurrent ventral hernia repair on 7/14/2022.    Patient has been doing well, tolerating diet. Bowels moving well. Pain controlled. No issues with wound healing/redness/drainage. No fevers.  Went back to the gym day 2 after surgery to start doing some easy cardio and has actually been able to increase back to his normal workout.  Has only done a little bit of light weights more recently but things have been feeling good.    Physical exam: Vitals: /68   Pulse 50   Wt 100.2 kg (221 lb)   BMI 30.61 kg/m    BMI= Body mass index is 30.61 kg/m .    Exam:  Constitutional: healthy, alert and no distress  Gastrointestinal: Abdomen soft, non-tender. BS normal. No masses, organomegaly  Incisions healing well no signs of infection.  No palpable hernia recurrence or other abnormality in central abdomen.      Assessment:     ICD-10-CM    1. S/P repair of recurrent ventral hernia  Z98.890     Z87.19      Plan: Recovering well from the procedure.  Okay to start increasing his activities as tolerated including weights.  Use of the abdominal binder is up to him for comfort.  Follow-up with me as needed.    Gurjit Perez MD

## 2023-04-25 ASSESSMENT — ENCOUNTER SYMPTOMS
CHILLS: 0
PARESTHESIAS: 0
PALPITATIONS: 0
HEMATURIA: 0
JOINT SWELLING: 0
NAUSEA: 0
ABDOMINAL PAIN: 0
EYE PAIN: 0
ARTHRALGIAS: 0
DIARRHEA: 0
HEMATOCHEZIA: 0
HEARTBURN: 0
COUGH: 0
FEVER: 0
WEAKNESS: 0
CONSTIPATION: 0
MYALGIAS: 0
DYSURIA: 0
SORE THROAT: 0
FREQUENCY: 0
NERVOUS/ANXIOUS: 0
SHORTNESS OF BREATH: 0
DIZZINESS: 0
HEADACHES: 0

## 2023-05-02 ENCOUNTER — OFFICE VISIT (OUTPATIENT)
Dept: FAMILY MEDICINE | Facility: CLINIC | Age: 63
End: 2023-05-02
Payer: COMMERCIAL

## 2023-05-02 VITALS
OXYGEN SATURATION: 99 % | BODY MASS INDEX: 33.74 KG/M2 | WEIGHT: 241 LBS | DIASTOLIC BLOOD PRESSURE: 66 MMHG | HEIGHT: 71 IN | HEART RATE: 53 BPM | SYSTOLIC BLOOD PRESSURE: 108 MMHG | RESPIRATION RATE: 16 BRPM | TEMPERATURE: 97.8 F

## 2023-05-02 DIAGNOSIS — Z00.00 ROUTINE GENERAL MEDICAL EXAMINATION AT A HEALTH CARE FACILITY: Primary | ICD-10-CM

## 2023-05-02 DIAGNOSIS — Z12.5 SCREENING FOR PROSTATE CANCER: ICD-10-CM

## 2023-05-02 DIAGNOSIS — I10 HYPERTENSION GOAL BP (BLOOD PRESSURE) < 140/90: ICD-10-CM

## 2023-05-02 DIAGNOSIS — E78.5 HYPERLIPIDEMIA LDL GOAL <130: ICD-10-CM

## 2023-05-02 DIAGNOSIS — Z13.1 SCREENING FOR DIABETES MELLITUS: ICD-10-CM

## 2023-05-02 DIAGNOSIS — Z12.11 SCREEN FOR COLON CANCER: ICD-10-CM

## 2023-05-02 LAB
ALBUMIN SERPL-MCNC: 4.3 G/DL (ref 3.4–5)
ALP SERPL-CCNC: 63 U/L (ref 40–150)
ALT SERPL W P-5'-P-CCNC: 49 U/L (ref 0–70)
ANION GAP SERPL CALCULATED.3IONS-SCNC: 6 MMOL/L (ref 3–14)
AST SERPL W P-5'-P-CCNC: 29 U/L (ref 0–45)
BILIRUB SERPL-MCNC: 0.5 MG/DL (ref 0.2–1.3)
BUN SERPL-MCNC: 21 MG/DL (ref 7–30)
CALCIUM SERPL-MCNC: 9.5 MG/DL (ref 8.5–10.1)
CHLORIDE BLD-SCNC: 108 MMOL/L (ref 94–109)
CHOLEST SERPL-MCNC: 226 MG/DL
CO2 SERPL-SCNC: 27 MMOL/L (ref 20–32)
CREAT SERPL-MCNC: 1 MG/DL (ref 0.66–1.25)
FASTING STATUS PATIENT QL REPORTED: YES
GFR SERPL CREATININE-BSD FRML MDRD: 86 ML/MIN/1.73M2
GLUCOSE BLD-MCNC: 127 MG/DL (ref 70–99)
HBA1C MFR BLD: 6.2 % (ref 0–5.6)
HDLC SERPL-MCNC: 101 MG/DL
LDLC SERPL CALC-MCNC: 111 MG/DL
NONHDLC SERPL-MCNC: 125 MG/DL
POTASSIUM BLD-SCNC: 4.8 MMOL/L (ref 3.4–5.3)
PROT SERPL-MCNC: 7.5 G/DL (ref 6.8–8.8)
PSA SERPL-MCNC: 0.3 UG/L (ref 0–4)
SODIUM SERPL-SCNC: 141 MMOL/L (ref 133–144)
TRIGL SERPL-MCNC: 68 MG/DL

## 2023-05-02 PROCEDURE — 80053 COMPREHEN METABOLIC PANEL: CPT | Performed by: FAMILY MEDICINE

## 2023-05-02 PROCEDURE — 99396 PREV VISIT EST AGE 40-64: CPT | Performed by: FAMILY MEDICINE

## 2023-05-02 PROCEDURE — 36415 COLL VENOUS BLD VENIPUNCTURE: CPT | Performed by: FAMILY MEDICINE

## 2023-05-02 PROCEDURE — G0103 PSA SCREENING: HCPCS | Performed by: FAMILY MEDICINE

## 2023-05-02 PROCEDURE — 83036 HEMOGLOBIN GLYCOSYLATED A1C: CPT | Performed by: FAMILY MEDICINE

## 2023-05-02 PROCEDURE — 99214 OFFICE O/P EST MOD 30 MIN: CPT | Mod: 25 | Performed by: FAMILY MEDICINE

## 2023-05-02 PROCEDURE — 80061 LIPID PANEL: CPT | Performed by: FAMILY MEDICINE

## 2023-05-02 ASSESSMENT — ENCOUNTER SYMPTOMS
CONSTIPATION: 0
HEARTBURN: 0
HEADACHES: 0
ABDOMINAL PAIN: 0
ARTHRALGIAS: 0
COUGH: 0
PALPITATIONS: 0
CHILLS: 0
WEAKNESS: 0
FEVER: 0
SORE THROAT: 0
JOINT SWELLING: 0
HEMATURIA: 0
HEMATOCHEZIA: 0
EYE PAIN: 0
DIZZINESS: 0
NAUSEA: 0
SHORTNESS OF BREATH: 0
NERVOUS/ANXIOUS: 0
PARESTHESIAS: 0
DYSURIA: 0
MYALGIAS: 0
DIARRHEA: 0
FREQUENCY: 0

## 2023-05-02 ASSESSMENT — PAIN SCALES - GENERAL: PAINLEVEL: NO PAIN (0)

## 2023-05-02 NOTE — PROGRESS NOTES
SUBJECTIVE:   CC: Ric is an 62 year old who presents for preventative health visit.       2023     8:16 AM   Additional Questions   Roomed by VE   Accompanied by SELF        Patient has been advised of split billing requirements and indicates understanding: Yes     Healthy Habits:     Getting at least 3 servings of Calcium per day:  Yes    Bi-annual eye exam:  NO    Dental care twice a year:  Yes    Sleep apnea or symptoms of sleep apnea:  None    Diet:  Regular (no restrictions), Carbohydrate counting and Gluten-free/reduced    Frequency of exercise:  6-7 days/week    Duration of exercise:  Greater than 60 minutes    Taking medications regularly:  Yes    Medication side effects:  None    PHQ-2 Total Score: 0    Additional concerns today:  No        Hyperlipidemia Follow-Up      Are you regularly taking any medication or supplement to lower your cholesterol?   Yes- Simvastatin    Are you having muscle aches or other side effects that you think could be caused by your cholesterol lowering medication?  No    Hypertension Follow-up      Do you check your blood pressure regularly outside of the clinic? Yes     Are you following a low salt diet? Yes    Are your blood pressures ever more than 140 on the top number (systolic) OR more   than 90 on the bottom number (diastolic), for example 140/90? No      Today's PHQ-2 Score:       2023     9:15 AM   PHQ-2 (  [employer])   Q1: Little interest or pleasure in doing things 0   Q2: Feeling down, depressed or hopeless 0   PHQ-2 Score 0   Q1: Little interest or pleasure in doing things Not at all    Not at all   Q2: Feeling down, depressed or hopeless Not at all    Not at all   PHQ-2 Score 0    0           Social History     Tobacco Use     Smoking status: Former     Packs/day: 0.50     Years: 20.00     Pack years: 10.00     Types: Cigarettes     Quit date: 10/20/2010     Years since quittin.5     Smokeless tobacco: Never   Vaping Use     Vaping status: Never Used    Substance Use Topics     Alcohol use: Not Currently     Alcohol/week: 0.8 standard drinks of alcohol     Comment: occasionally 1-2 drinks per week             4/25/2023     9:15 AM   Alcohol Use   Prescreen: >3 drinks/day or >7 drinks/week? No       Last PSA:   PSA   Date Value Ref Range Status   12/01/2020 0.35 0 - 4 ug/L Final     Comment:     Assay Method:  Chemiluminescence using Siemens Vista analyzer     Prostate Specific Antigen Screen   Date Value Ref Range Status   04/22/2022 0.37 0.00 - 4.00 ug/L Final       Reviewed orders with patient. Reviewed health maintenance and updated orders accordingly - Yes  BP Readings from Last 3 Encounters:   05/02/23 108/66   07/27/22 104/68   06/28/22 104/68    Wt Readings from Last 3 Encounters:   05/02/23 109.3 kg (241 lb)   07/27/22 100.2 kg (221 lb)   06/28/22 100.2 kg (221 lb)                    Reviewed and updated as needed this visit by clinical staff   Tobacco  Allergies  Meds              Reviewed and updated as needed this visit by Provider                     Review of Systems   Constitutional: Negative for chills and fever.   HENT: Negative for congestion, ear pain, hearing loss and sore throat.    Eyes: Negative for pain and visual disturbance.   Respiratory: Negative for cough and shortness of breath.    Cardiovascular: Negative for chest pain, palpitations and peripheral edema.   Gastrointestinal: Negative for abdominal pain, constipation, diarrhea, heartburn, hematochezia and nausea.   Genitourinary: Negative for dysuria, frequency, genital sores, hematuria, impotence, penile discharge and urgency.   Musculoskeletal: Negative for arthralgias, joint swelling and myalgias.   Skin: Negative for rash.   Neurological: Negative for dizziness, weakness, headaches and paresthesias.   Psychiatric/Behavioral: Negative for mood changes. The patient is not nervous/anxious.          OBJECTIVE:   /66 (BP Location: Left arm, Patient Position: Chair, Cuff Size:  "Adult Large)   Pulse 53   Temp 97.8  F (36.6  C) (Temporal)   Resp 16   Ht 1.803 m (5' 11\")   Wt 109.3 kg (241 lb)   SpO2 99%   BMI 33.61 kg/m      Physical Exam  GENERAL: healthy, alert and no distress  EYES: Eyes grossly normal to inspection, PERRL and conjunctivae and sclerae normal  HENT: ear canals and TM's normal, nose and mouth without ulcers or lesions  NECK: no adenopathy, no asymmetry, masses, or scars and thyroid normal to palpation  RESP: lungs clear to auscultation - no rales, rhonchi or wheezes  CV: regular rate and rhythm, normal S1 S2, no S3 or S4, no murmur, click or rub, no peripheral edema and peripheral pulses strong  ABDOMEN: soft, nontender, no hepatosplenomegaly, no masses and bowel sounds normal  MS: varicose veins present  SKIN: no suspicious lesions or rashes  NEURO: Normal strength and tone, mentation intact and speech normal  PSYCH: mentation appears normal, affect normal/bright    Diagnostic Test Results:  Labs reviewed in Epic    ASSESSMENT/PLAN:   (Z00.00) Routine general medical examination at a health care facility  (primary encounter diagnosis)  Comment: see counseling messages  Plan: recheck in one year.     (I10) Hypertension goal BP (blood pressure) < 140/90  Comment: Doing well. Well controlled. Tolerating medication.  No change in plan.    Plan: COMPREHENSIVE METABOLIC PANEL, OFFICE/OUTPT         VISIT,EST,LEVL IV            (E78.5) Hyperlipidemia LDL goal <130  Comment: Doing well. Well controlled. Tolerating medication.  No change in plan.    Plan: Lipid panel reflex to direct LDL Non-fasting,         OFFICE/OUTPT VISIT,EST,LEVL IV            (Z13.1) Screening for diabetes mellitus  Comment: Will notify of results.   Plan: Hemoglobin A1c            (Z12.11) Screen for colon cancer  Comment: due in September  Plan: Colonoscopy Screening  Referral            (Z12.5) Screening for prostate cancer  Comment: Will notify of results.   Plan: PSA, screen        " "      Patient has been advised of split billing requirements and indicates understanding: Yes      COUNSELING:   Reviewed preventive health counseling, as reflected in patient instructions       Regular exercise       Healthy diet/nutrition      BMI:   Estimated body mass index is 33.61 kg/m  as calculated from the following:    Height as of this encounter: 1.803 m (5' 11\").    Weight as of this encounter: 109.3 kg (241 lb).   Weight management plan: Discussed healthy diet and exercise guidelines      He reports that he quit smoking about 12 years ago. His smoking use included cigarettes. He has a 10.00 pack-year smoking history. He has never used smokeless tobacco.            Annie Wall MD  [facility] [place]  "

## 2023-06-13 DIAGNOSIS — I10 HYPERTENSION GOAL BP (BLOOD PRESSURE) < 140/90: Chronic | ICD-10-CM

## 2023-06-13 DIAGNOSIS — E78.5 HYPERLIPIDEMIA LDL GOAL <130: ICD-10-CM

## 2023-06-13 RX ORDER — LISINOPRIL 40 MG/1
TABLET ORAL
Qty: 90 TABLET | Refills: 3 | Status: SHIPPED | OUTPATIENT
Start: 2023-06-13 | End: 2024-05-07

## 2023-06-13 RX ORDER — SIMVASTATIN 20 MG
TABLET ORAL
Qty: 90 TABLET | Refills: 3 | Status: SHIPPED | OUTPATIENT
Start: 2023-06-13 | End: 2024-05-14

## 2023-10-19 ENCOUNTER — TELEPHONE (OUTPATIENT)
Dept: GASTROENTEROLOGY | Facility: CLINIC | Age: 63
End: 2023-10-19
Payer: COMMERCIAL

## 2023-10-19 NOTE — TELEPHONE ENCOUNTER
"Endoscopy Scheduling Screen    Have you had a positive Covid test in the last 14 days?  No    Are you active on MyChart?   Yes    What insurance is in the chart?  Other:  [employer]    Ordering/Referring Provider: TRENT PERES   (If ordering provider performs procedure, schedule with ordering provider unless otherwise instructed. )    BMI: Estimated body mass index is 33.61 kg/m  as calculated from the following:    Height as of 5/2/23: 1.803 m (5' 11\").    Weight as of 5/2/23: 109.3 kg (241 lb).     Sedation Ordered  moderate sedation.   If patient BMI > 50 do not schedule in ASC.    If patient BMI > 45 do not schedule at [facility].    Are you taking methadone or Suboxone?  No    Are you taking any prescription medications for pain 3 or more times per week?   No    Do you have a history of malignant hyperthermia or adverse reaction to anesthesia?  No    (Females) Are you currently pregnant?   No     Have you been diagnosed or told you have pulmonary hypertension?   No    Do you have an LVAD?  No    Have you been told you have moderate to severe sleep apnea?  No    Have you been told you have COPD, asthma, or any other lung disease?  No    Do you have any heart conditions?  No     Have you ever had an organ transplant?   No    Have you ever had or are you awaiting a heart or lung transplant?   No    Have you had a stroke or transient ischemic attack (TIA aka \"mini stroke\" in the last 6 months?   No    Have you been diagnosed with or been told you have cirrhosis of the liver?   No    Are you currently on dialysis?   No    Do you need assistance transferring?   No    BMI: Estimated body mass index is 33.61 kg/m  as calculated from the following:    Height as of 5/2/23: 1.803 m (5' 11\").    Weight as of 5/2/23: 109.3 kg (241 lb).     Is patients BMI > 40 and scheduling location [facility]?  No    Do you take an injectable medication for weight loss or diabetes (excluding insulin)?  No    Do you take the medication " Naltrexone?  No    Do you take blood thinners?  No       Prep   Are you currently on dialysis or do you have chronic kidney disease?  No    Do you have a diagnosis of diabetes?  No    Do you have a diagnosis of cystic fibrosis (CF)?  No    On a regular basis do you go 3 -5 days between bowel movements?  No    BMI > 40?  No    Preferred Pharmacy:    [id number] - [place] - [address]  [address]  [place] [address]  Phone: [phone] Fax: [phone]      Final Scheduling Details   Colonoscopy prep sent?  Standard MiraLAX    Procedure scheduled  Colonoscopy    Surgeon:  JA     Date of procedure:  1/5/24     Pre-OP / PAC:   No - Not required for this site.    Location  [facility] - [facility] - Per order.    Sedation   Moderate Sedation - Per order.      Patient Reminders:   You will receive a call from a Nurse to review instructions and health history.  This assessment must be completed prior to your procedure.  Failure to complete the Nurse assessment may result in the procedure being cancelled.      On the day of your procedure, please designate an adult(s) who can drive you home stay with you for the next 24 hours. The medicines used in the exam will make you sleepy. You will not be able to drive.      You cannot take public transportation, ride share services, or non-medical taxi service without a responsible caregiver.  Medical transport services are allowed with the requirement that a responsible caregiver will receive you at your destination.  We require that drivers and caregivers are confirmed prior to your procedure.

## 2023-11-07 ENCOUNTER — MYC MEDICAL ADVICE (OUTPATIENT)
Dept: FAMILY MEDICINE | Facility: CLINIC | Age: 63
End: 2023-11-07
Payer: COMMERCIAL

## 2023-11-08 ENCOUNTER — TRANSFERRED RECORDS (OUTPATIENT)
Dept: HEALTH INFORMATION MANAGEMENT | Facility: CLINIC | Age: 63
End: 2023-11-08
Payer: COMMERCIAL

## 2023-12-18 ENCOUNTER — TELEPHONE (OUTPATIENT)
Dept: GASTROENTEROLOGY | Facility: CLINIC | Age: 63
End: 2023-12-18
Payer: COMMERCIAL

## 2023-12-18 NOTE — TELEPHONE ENCOUNTER
Pre visit planning completed.      Procedure details:    Patient scheduled for Colonoscopy  on 1/5/24.     Arrival time: 0945. Procedure time 1045    Pre op exam needed? N/A    Facility location: [facility]; [address], [place] [address]    Sedation type: Conscious sedation     Indication for procedure: screen for colon cancer       Chart review:     Electronic implanted devices? No    Recent diagnosis of diverticulitis within the last 6 weeks? No    Diabetic? No        Medication review:    Anticoagulants? No    NSAIDS? No    Other medication HOLDING recommendations:  N/A      Prep for procedure:     Bowel prep recommendation: Standard Miralax   Due to:  standard bowel prep.    Prep instructions sent via [employer]       Corinne Kliber, RN  Endoscopy Procedure Pre Assessment RN  [phone] option 4

## 2023-12-18 NOTE — TELEPHONE ENCOUNTER
Pre assessment completed for upcoming procedure.   (Please see previous telephone encounter notes for complete details)    Patient  returned call.       Procedure details:    Arrival time and facility location reviewed.    Pre op exam needed? N/A    Designated  policy reviewed. Instructed to have someone stay 6 hours post procedure.     COVID policy reviewed.      Medication review:    NSAID medication(s): Ibuprofen (Advil, Motrin): HOLD 1 day before procedure.      Prep for procedure:     Procedure prep instructions reviewed.        Additional information needed?  N/A      Patient  verbalized understanding and had no questions or concerns at this time.      Cristiane Vega RN  Endoscopy Procedure Pre Assessment RN  [phone] option 4

## 2023-12-18 NOTE — TELEPHONE ENCOUNTER
Attempted to contact patient in order to complete pre assessment questions.     No answer. Left message to return call to [phone] option 4    Missed call communication sent via [employer].    Corinne Kliber, RN  Endoscopy Procedure Pre Assessment RN  [phone] option 4

## 2023-12-29 ENCOUNTER — TELEPHONE (OUTPATIENT)
Dept: GASTROENTEROLOGY | Facility: CLINIC | Age: 63
End: 2023-12-29
Payer: COMMERCIAL

## 2023-12-29 NOTE — TELEPHONE ENCOUNTER
Caller: Ric  Reason for Reschedule/Cancellation (please be detailed, any staff messages or encounters to note?): needs another date      Prior to reschedule please review:  Ordering Provider: TRENT PERES   Sedation per order: CS  Does patient have any ASC Exclusions, please identify?: no      Notes on Cancelled Procedure:  Procedure: Lower Endoscopy [Colonoscopy]   Date: 1/5  Location: [facility]; [address], [place] [address]  Surgeon: Duke      Rescheduled: Yes  Procedure: Lower Endoscopy [Colonoscopy]   Date: 3/22  Location: [facility]; [address], [place] [address]  Surgeon: Duke  Sedation Level Scheduled  CS,  Reason for Sedation Level order  Prep/Instructions updated and sent: y       Send In - basket message to Panc - Filippo Pool if [facility]  procedure is canceled or rescheduled: [ N/A, YES or NO] n/a

## 2024-03-10 NOTE — TELEPHONE ENCOUNTER
Rescheduled procedure. Pre visit planning completed.    Procedure details:    Patient scheduled for Colonoscopy  on 3.22.2024.    Arrival time: 0945. Procedure time 1030    Pre op exam needed? N/A    Facility location: [facility]; [address], [place] [address]    Sedation type: Conscious sedation - Pt tolerated CS with 2013 colonoscopy per chart review.    Pre-Assessment was completed for previously scheduled procedure. (See documentation below).  No new medical events or medications since last review.     Resent prep instructions via [employer].    Cristiane Vega RN  Endoscopy Procedure Pre Assessment RN

## 2024-03-11 ENCOUNTER — TELEPHONE (OUTPATIENT)
Dept: GASTROENTEROLOGY | Facility: CLINIC | Age: 64
End: 2024-03-11
Payer: COMMERCIAL

## 2024-03-11 NOTE — TELEPHONE ENCOUNTER
"Endoscopy Scheduling Screen    Have you had a positive Covid test in the last 14 days?  No    What is your communication preference for Instructions and/or Bowel Prep?   MyChart    What insurance is in the chart?  Other:      Ordering/Referring Provider:     TRENT PERES      (If ordering provider performs procedure, schedule with ordering provider unless otherwise instructed. )    BMI: Estimated body mass index is 33.61 kg/m  as calculated from the following:    Height as of 5/2/23: 1.803 m (5' 11\").    Weight as of 5/2/23: 109.3 kg (241 lb).     Sedation Ordered  moderate sedation.   If patient BMI > 50 do not schedule in [facility].    If patient BMI > 45 do not schedule at [facility].    Are you taking methadone or Suboxone?  No    Have you had difficulties, pain, or discomfort during past endoscopy procedures?  No    Are you taking any prescription medications for pain 3 or more times per week?   NO, No RN review required.    Do you have a history of malignant hyperthermia?  No    (Females) Are you currently pregnant?   No     Have you been diagnosed or told you have pulmonary hypertension?   No    Do you have an LVAD?  No    Have you been told you have moderate to severe sleep apnea?  No    Have you been told you have COPD, asthma, or any other lung disease?  No    Do you have any heart conditions?  No     Have you ever had or are you waiting for an organ transplant?  No. Continue scheduling, no site restrictions.    Have you had a stroke or transient ischemic attack (TIA aka \"mini stroke\" in the last 6 months?   No    Have you been diagnosed with or been told you have cirrhosis of the liver?   No    Are you currently on dialysis?   No    Do you need assistance transferring?   No    BMI: Estimated body mass index is 33.61 kg/m  as calculated from the following:    Height as of 5/2/23: 1.803 m (5' 11\").    Weight as of 5/2/23: 109.3 kg (241 lb).     Is patients BMI > 40 and scheduling location [facility]?  No    Do you take " an injectable medication for weight loss or diabetes (excluding insulin)?  No    Do you take the medication Naltrexone?  No    Do you take blood thinners?  No       Prep   Are you currently on dialysis or do you have chronic kidney disease?  No    Do you have a diagnosis of diabetes?  No    Do you have a diagnosis of cystic fibrosis (CF)?  No    On a regular basis do you go 3 -5 days between bowel movements?  No    BMI > 40?  No    Preferred Pharmacy:    [facility] #[id number] - [place] - [address]  [place] [address]  Phone: [phone] Fax: [phone]      Final Scheduling Details     Caller: Henrry Cortes      Reason for Reschedule/Cancellation   (please be detailed, any staff messages or encounters to note?): PT HAD A SCHEDULING CONFLICT      Prior to reschedule please review:  Ordering Provider:     TRENT PERES     Sedation Determined: MODERATE  Does patient have any ASC Exclusions, please identify?: N      Notes on Cancelled Procedure:  Procedure: Lower Endoscopy [Colonoscopy]   Date: 03/22/2024  Location: [facility]; [address], [place] [id number]   Surgeon: JA      Rescheduled: Yes,   Procedure: Lower Endoscopy [Colonoscopy]    Date: 05/17/2024   Location: [facility]; [address], [place] [address]    Surgeon: JA   Sedation Level Scheduled  MODERATE ,  Reason for Sedation Level PER ORDER   Instructions updated and sent: VIA [facility]     Does patient need PAC or Pre -Op Rescheduled? : NO       Did you cancel or rescheduled an [facility] procedure? No.

## 2024-03-11 NOTE — TELEPHONE ENCOUNTER
Attempted to contact patient in order to complete pre assessment questions.     No answer. Left message to return call to [phone] option 4    Missed call communication sent via [employer].    Cristiane Vega RN

## 2024-05-06 SDOH — HEALTH STABILITY: PHYSICAL HEALTH: ON AVERAGE, HOW MANY DAYS PER WEEK DO YOU ENGAGE IN MODERATE TO STRENUOUS EXERCISE (LIKE A BRISK WALK)?: 7 DAYS

## 2024-05-06 SDOH — HEALTH STABILITY: PHYSICAL HEALTH: ON AVERAGE, HOW MANY MINUTES DO YOU ENGAGE IN EXERCISE AT THIS LEVEL?: 50 MIN

## 2024-05-06 ASSESSMENT — SOCIAL DETERMINANTS OF HEALTH (SDOH): HOW OFTEN DO YOU GET TOGETHER WITH FRIENDS OR RELATIVES?: THREE TIMES A WEEK

## 2024-05-07 ENCOUNTER — OFFICE VISIT (OUTPATIENT)
Dept: FAMILY MEDICINE | Facility: CLINIC | Age: 64
End: 2024-05-07
Attending: FAMILY MEDICINE
Payer: COMMERCIAL

## 2024-05-07 VITALS
HEART RATE: 55 BPM | BODY MASS INDEX: 35.85 KG/M2 | RESPIRATION RATE: 20 BRPM | OXYGEN SATURATION: 98 % | DIASTOLIC BLOOD PRESSURE: 68 MMHG | WEIGHT: 256.1 LBS | SYSTOLIC BLOOD PRESSURE: 120 MMHG | HEIGHT: 71 IN | TEMPERATURE: 97.9 F

## 2024-05-07 DIAGNOSIS — Z12.5 SCREENING FOR PROSTATE CANCER: ICD-10-CM

## 2024-05-07 DIAGNOSIS — E66.812 CLASS 2 SEVERE OBESITY DUE TO EXCESS CALORIES WITH SERIOUS COMORBIDITY AND BODY MASS INDEX (BMI) OF 36.0 TO 36.9 IN ADULT (H): ICD-10-CM

## 2024-05-07 DIAGNOSIS — R73.01 IMPAIRED FASTING GLUCOSE: ICD-10-CM

## 2024-05-07 DIAGNOSIS — Z00.00 ROUTINE GENERAL MEDICAL EXAMINATION AT A HEALTH CARE FACILITY: Primary | ICD-10-CM

## 2024-05-07 DIAGNOSIS — I10 HYPERTENSION GOAL BP (BLOOD PRESSURE) < 140/90: Chronic | ICD-10-CM

## 2024-05-07 DIAGNOSIS — E66.01 CLASS 2 SEVERE OBESITY DUE TO EXCESS CALORIES WITH SERIOUS COMORBIDITY AND BODY MASS INDEX (BMI) OF 36.0 TO 36.9 IN ADULT (H): ICD-10-CM

## 2024-05-07 DIAGNOSIS — G47.33 OSA (OBSTRUCTIVE SLEEP APNEA): ICD-10-CM

## 2024-05-07 DIAGNOSIS — E78.5 HYPERLIPIDEMIA LDL GOAL <130: ICD-10-CM

## 2024-05-07 PROCEDURE — 99214 OFFICE O/P EST MOD 30 MIN: CPT | Mod: 25 | Performed by: FAMILY MEDICINE

## 2024-05-07 PROCEDURE — 99396 PREV VISIT EST AGE 40-64: CPT | Performed by: FAMILY MEDICINE

## 2024-05-07 RX ORDER — LISINOPRIL 40 MG/1
40 TABLET ORAL DAILY
Qty: 90 TABLET | Refills: 3 | Status: SHIPPED | OUTPATIENT
Start: 2024-05-07 | End: 2024-08-13

## 2024-05-07 ASSESSMENT — PAIN SCALES - GENERAL: PAINLEVEL: NO PAIN (0)

## 2024-05-07 NOTE — PROGRESS NOTES
"Preventive Care Visit  [facility] MIKEY Wall MD, Family Medicine  May 7, 2024      Assessment & Plan     Routine general medical examination at a health care facility  See counseling messages     Hypertension goal BP (blood pressure) < 140/90  Doing well. Well controlled. Tolerating medication.  No change in plan.    - OFFICE/OUTPT VISIT,EST,LEVL IV  - Comprehensive metabolic panel (BMP + Alb, Alk Phos, ALT, AST, Total. Bili, TP); Future  - lisinopril (ZESTRIL) 40 MG tablet; Take 1 tablet (40 mg) by mouth daily    Class 2 severe obesity due to excess calories with serious comorbidity and body mass index (BMI) of 36.0 to 36.9 in adult (H)  Understands the role excess weight can play on his health. Has plans to address with activity and nutrition.     DELANEY (obstructive sleep apnea)-Mild (AHI 8)  No currently using cpap. Doing well. No concerns.   - OFFICE/OUTPT VISIT,EST,LEVL IV    HYPERLIPIDEMIA LDL GOAL <130  Awaiting results. Dose adjustment as needed.    - OFFICE/OUTPT VISIT,EST,LEVL IV  - Lipid panel reflex to direct LDL Fasting; Future    Impaired fasting glucose  Will notify of results.   - OFFICE/OUTPT VISIT,EST,LEVL IV  - Hemoglobin A1c; Future    Screening for prostate cancer  Will notify of results.   - PSA, screen; Future              BMI  Estimated body mass index is 36.05 kg/m  as calculated from the following:    Height as of this encounter: 1.795 m (5' 10.67\").    Weight as of this encounter: 116.2 kg (256 lb 1.6 oz).   Weight management plan: as above    Counseling  Appropriate preventive services were discussed with this patient, including applicable screening as appropriate for fall prevention, nutrition, physical activity, Tobacco-use cessation, weight loss and cognition.  Checklist reviewing preventive services available has been given to the patient.  Reviewed patient's diet, addressing concerns and/or questions.           Mallory Larios is a 63 year old, " presenting for the following:  Physical        5/7/2024     8:06 AM   Additional Questions   Roomed by Alicia BISWAS   Accompanied by None         5/7/2024     8:06 AM   Patient Reported Additional Medications   Patient reports taking the following new medications NA        Health Care Directive  Patient has a Health Care Directive on file  Advance care planning document is on file and is current.    HPI    Has some pain upper right shoulder and into elbow at times. Hasn't gotten better with rest. Seems to be some weakness in bicep at the elbow.       Hyperlipidemia Follow-Up    Are you regularly taking any medication or supplement to lower your cholesterol?   Yes- simvastatin  Are you having muscle aches or other side effects that you think could be caused by your cholesterol lowering medication?  No    Hypertension Follow-up    Do you check your blood pressure regularly outside of the clinic? No   Are you following a low salt diet? Yes  Are your blood pressures ever more than 140 on the top number (systolic) OR more   than 90 on the bottom number (diastolic), for example 140/90? No    DELANEY - reports not using CPAP. Reports that he is sleeping well and no issues during the day.           5/6/2024   General Health   How would you rate your overall physical health? Good   Feel stress (tense, anxious, or unable to sleep) To some extent   (!) STRESS CONCERN      5/6/2024   Nutrition   Three or more servings of calcium each day? Yes   Diet: Regular (no restrictions)   How many servings of fruit and vegetables per day? (!) 2-3   How many sweetened beverages each day? 0-1         5/6/2024   Exercise   Days per week of moderate/strenous exercise 7 days   Average minutes spent exercising at this level 50 min         5/6/2024   Social Factors   Frequency of gathering with friends or relatives Three times a week   Worry food won't last until get money to buy more No   Food not last or not have enough money for food? No   Do you have  housing?  Yes   Are you worried about losing your housing? No   Lack of transportation? No   Unable to get utilities (heat,electricity)? No         2024   Fall Risk   Fallen 2 or more times in the past year? No   Trouble with walking or balance? No          2024   Dental   Dentist two times every year? Yes         2024   TB Screening   Were you born outside of the [place]? No         Today's PHQ-2 Score:       2024     7:10 PM   PHQ-2 (  [employer])   Q1: Little interest or pleasure in doing things 0   Q2: Feeling down, depressed or hopeless 0   PHQ-2 Score 0   Q1: Little interest or pleasure in doing things Not at all   Q2: Feeling down, depressed or hopeless Not at all   PHQ-2 Score 0           2024   Substance Use   Alcohol more than 3/day or more than 7/wk No   Do you use any other substances recreationally? No     Social History     Tobacco Use    Smoking status: Former     Current packs/day: 0.00     Average packs/day: 0.5 packs/day for 20.0 years (10.0 ttl pk-yrs)     Types: Cigarettes     Start date: 10/20/1990     Quit date: 10/20/2010     Years since quittin.5    Smokeless tobacco: Never   Vaping Use    Vaping status: Never Used   Substance Use Topics    Alcohol use: Not Currently     Alcohol/week: 0.8 standard drinks of alcohol     Comment: occasionally 1-2 drinks per week    Drug use: No           2024   STI Screening   New sexual partner(s) since last STI/HIV test? No   Last PSA:   PSA   Date Value Ref Range Status   2020 0.35 0 - 4 ug/L Final     Comment:     Assay Method:  Chemiluminescence using Siemens Vista analyzer     Prostate Specific Antigen Screen   Date Value Ref Range Status   2023 0.30 0.00 - 4.00 ug/L Final     ASCVD Risk   The ASCVD Risk score (Edita NG, et al., 2019) failed to calculate for the following reasons:    The valid HDL cholesterol range is 20 to 100 mg/dL           Reviewed and updated as needed this visit by Provider                "              Objective    Exam  /68   Pulse 55   Temp 97.9  F (36.6  C) (Temporal)   Resp 20   Ht 1.795 m (5' 10.67\")   Wt 116.2 kg (256 lb 1.6 oz)   SpO2 98%   BMI 36.05 kg/m     Estimated body mass index is 36.05 kg/m  as calculated from the following:    Height as of this encounter: 1.795 m (5' 10.67\").    Weight as of this encounter: 116.2 kg (256 lb 1.6 oz).    Physical Exam  GENERAL: alert and no distress  EYES: Eyes grossly normal to inspection, PERRL and conjunctivae and sclerae normal  HENT: ear canals and TM's normal, nose and mouth without ulcers or lesions  NECK: no adenopathy, no asymmetry, masses, or scars  RESP: lungs clear to auscultation - no rales, rhonchi or wheezes  CV: regular rate and rhythm, normal S1 S2, no S3 or S4, no murmur, click or rub, no peripheral edema  ABDOMEN: soft, nontender, no hepatosplenomegaly, no masses and bowel sounds normal  MS: no gross musculoskeletal defects noted, no edema  SKIN: no suspicious lesions or rashes  NEURO: Normal strength and tone, mentation intact and speech normal  PSYCH: mentation appears normal, affect normal/bright        Signed Electronically by: Annie Wall MD    "

## 2024-05-07 NOTE — PATIENT INSTRUCTIONS
"Preventive Care Advice   This is general advice we often give to help people stay healthy. Your care team may have specific advice just for you. Please talk to your care team about your own preventive care needs.  Lifestyle  Exercise at least 150 minutes each week (30 minutes a day, 5 days a week).  Do muscle strengthening activities 2 days a week. These help control your weight and prevent disease.  No smoking.  Wear sunscreen to prevent skin cancer.  Have your home tested for radon every 2 to 5 years. Radon is a colorless, odorless gas that can harm your lungs. To learn more, go to www.health.[facility].[place]. and search for \"Radon in Homes.\"  Keep guns unloaded and locked up in a safe place like a safe or gun vault, or, use a gun lock and hide the keys. Always lock away bullets separately. To learn more, visit [employer].[place].gov and search for \"safe gun storage.\"  Nutrition  Eat 5 or more servings of fruits and vegetables each day.  Try wheat bread, brown rice and whole grain pasta (instead of white bread, rice, and pasta).  Get enough calcium and vitamin D. Check the label on foods and aim for 100% of the RDA (recommended daily allowance).  Regular exams  Have a dental exam and cleaning every 6 months.  See your health care team every year to talk about:  Any changes in your health.  Any medicines your care team has prescribed.  Preventive care, family planning, and ways to prevent chronic diseases.  Shots (vaccines)   HPV shots (up to age 26), if you've never had them before.  Hepatitis B shots (up to age 59), if you've never had them before.  COVID-19 shot: Get this shot when it's due.  Flu shot: Get a flu shot every year.  Tetanus shot: Get a tetanus shot every 10 years.  Pneumococcal, hepatitis A, and RSV shots: Ask your care team if you need these based on your risk.  Shingles shot (for age 50 and up).  General health tests  Diabetes screening:  Starting at age 35, Get screened for diabetes at least every 3 years.  If " you are younger than age 35, ask your care team if you should be screened for diabetes.  Cholesterol test: At age 39, start having a cholesterol test every 5 years, or more often if advised.  Bone density scan (DEXA): At age 50, ask your care team if you should have this scan for osteoporosis (brittle bones).  Hepatitis C: Get tested at least once in your life.  Abdominal aortic aneurysm screening: Talk to your doctor about having this screening if you:  Have ever smoked; and  Are biologically male; and  Are between the ages of 65 and 75.  STIs (sexually transmitted infections)  Before age 24: Ask your care team if you should be screened for STIs.  After age 24: Get screened for STIs if you're at risk. You are at risk for STIs (including HIV) if:  You are sexually active with more than one person.  You don't use condoms every time.  You or a partner was diagnosed with a sexually transmitted infection.  If you are at risk for HIV, ask about PrEP medicine to prevent HIV.  Get tested for HIV at least once in your life, whether you are at risk for HIV or not.  Cancer screening tests  Cervical cancer screening: If you have a cervix, begin getting regular cervical cancer screening tests at age 21. Most people who have regular screenings with normal results can stop after age 65. Talk about this with your provider.  Breast cancer scan (mammogram): If you've ever had breasts, begin having regular mammograms starting at age 40. This is a scan to check for breast cancer.  Colon cancer screening: It is important to start screening for colon cancer at age 45.  Have a colonoscopy test every 10 years (or more often if you're at risk) Or, ask your provider about stool tests like a FIT test every year or Cologuard test every 3 years.  To learn more about your testing options, visit: www.[employer]/678033.pdf.  For help making a decision, visit: eren/il29964.  Prostate cancer screening test: If you have a prostate and are age 55  to 69, ask your provider if you would benefit from a yearly prostate cancer screening test.  Lung cancer screening: If you are a current or former smoker age 50 to 80, ask your care team if ongoing lung cancer screenings are right for you.  For informational purposes only. Not to replace the advice of your health care provider. Copyright   2023 [employer]. All rights reserved. Clinically reviewed by the [facility] Transitions Program. [employer] 452021 - REV 04/24.    Learning About Stress  What is stress?     Stress is your body's response to a hard situation. Your body can have a physical, emotional, or mental response. Stress is a fact of life for most people, and it affects everyone differently. What causes stress for you may not be stressful for someone else.  A lot of things can cause stress. You may feel stress when you go on a job interview, take a test, or run a race. This kind of short-term stress is normal and even useful. It can help you if you need to work hard or react quickly. For example, stress can help you finish an important job on time.  Long-term stress is caused by ongoing stressful situations or events. Examples of long-term stress include long-term health problems, ongoing problems at work, or conflicts in your family. Long-term stress can harm your health.  How does stress affect your health?  When you are stressed, your body responds as though you are in danger. It makes hormones that speed up your heart, make you breathe faster, and give you a burst of energy. This is called the fight-or-flight stress response. If the stress is over quickly, your body goes back to normal and no harm is done.  But if stress happens too often or lasts too long, it can have bad effects. Long-term stress can make you more likely to get sick, and it can make symptoms of some diseases worse. If you tense up when you are stressed, you may develop neck, shoulder, or low back pain. Stress is  linked to high blood pressure and heart disease.  Stress also harms your emotional health. It can make you ferreira, tense, or depressed. Your relationships may suffer, and you may not do well at work or school.  What can you do to manage stress?  You can try these things to help manage stress:   Do something active. Exercise or activity can help reduce stress. Walking is a great way to get started. Even everyday activities such as housecleaning or yard work can help.  Try yoga or angela chi. These techniques combine exercise and meditation. You may need some training at first to learn them.  Do something you enjoy. For example, listen to music or go to a movie. Practice your hobby or do volunteer work.  Meditate. This can help you relax, because you are not worrying about what happened before or what may happen in the future.  Do guided imagery. Imagine yourself in any setting that helps you feel calm. You can use online videos, books, or a teacher to guide you.  Do breathing exercises. For example:  From a standing position, bend forward from the waist with your knees slightly bent. Let your arms dangle close to the floor.  Breathe in slowly and deeply as you return to a standing position. Roll up slowly and lift your head last.  Hold your breath for just a few seconds in the standing position.  Breathe out slowly and bend forward from the waist.  Let your feelings out. Talk, laugh, cry, and express anger when you need to. Talking with supportive friends or family, a counselor, or a byron leader about your feelings is a healthy way to relieve stress. Avoid discussing your feelings with people who make you feel worse.  Write. It may help to write about things that are bothering you. This helps you find out how much stress you feel and what is causing it. When you know this, you can find better ways to cope.  What can you do to prevent stress?  You might try some of these things to help prevent stress:  Manage your time.  "This helps you find time to do the things you want and need to do.  Get enough sleep. Your body recovers from the stresses of the day while you are sleeping.  Get support. Your family, friends, and community can make a difference in how you experience stress.  Limit your news feed. Avoid or limit time on social media or news that may make you feel stressed.  Do something active. Exercise or activity can help reduce stress. Walking is a great way to get started.  Where can you learn more?  Go to https://www.[employer].net/patiented  Enter N032 in the search box to learn more about \"Learning About Stress.\"  Current as of: October 24, 2023               Content Version: 14.0    9363-3525 [employer].   Care instructions adapted under license by your healthcare professional. If you have questions about a medical condition or this instruction, always ask your healthcare professional. [employer] disclaims any warranty or liability for your use of this information.      "

## 2024-05-08 NOTE — TELEPHONE ENCOUNTER
Attempted to contact patient in order to complete pre assessment questions.     No answer. Left message to return call to [phone] option 4    Callback communication sent via [employer].    Corinne Kliber, RN  Endoscopy Procedure Pre Assessment RN  [phone] option 4

## 2024-05-08 NOTE — TELEPHONE ENCOUNTER
Rescheduled colonoscopy    Pre visit planning completed.      Procedure details:    Patient scheduled for Colonoscopy  on 5.17.24.     Arrival time: 0815. Procedure time 0900    Facility location: [facility]; [address] Floor, [place] [address]. Check in location: 2nd Floor at Surgery desk.    Sedation type: Conscious sedation     Pre op exam needed? N/A    Indication for procedure: screening      Chart review:     Electronic implanted devices? No    Recent diagnosis of diverticulitis within the last 6 weeks? No    Diabetic? No      Medication review:    Anticoagulants? No    NSAIDS? No    Other medication HOLDING recommendations:  N/A      Prep for procedure:     Bowel prep recommendation: Standard Miralax  Due to: standard bowel prep.    Prep instructions sent via [email or web address]         Erendira Carrasco RN  Endoscopy Procedure Pre Assessment RN  [phone] option 4

## 2024-05-10 NOTE — TELEPHONE ENCOUNTER
Pre assessment completed for upcoming procedure.   (Please see previous telephone encounter notes for complete details)    Procedure details:    Arrival time and facility location reviewed.    Pre op exam needed? N/A    Designated  policy reviewed. Instructed to have someone stay 6 hours post procedure.     COVID policy reviewed.      Medication review:    Medications reviewed. Please see supporting documentation below. Holding recommendations discussed (if applicable).       Prep for procedure:     Procedure prep instructions reviewed.        Additional information needed?  N/A      Patient  verbalized understanding and had no questions or concerns at this time.      Corinne Kliber, RN  Endoscopy Procedure Pre Assessment RN  [phone] option 4

## 2024-05-14 ENCOUNTER — LAB (OUTPATIENT)
Dept: LAB | Facility: CLINIC | Age: 64
End: 2024-05-14
Payer: COMMERCIAL

## 2024-05-14 DIAGNOSIS — R73.01 IMPAIRED FASTING GLUCOSE: ICD-10-CM

## 2024-05-14 DIAGNOSIS — Z12.5 SCREENING FOR PROSTATE CANCER: ICD-10-CM

## 2024-05-14 DIAGNOSIS — I10 HYPERTENSION GOAL BP (BLOOD PRESSURE) < 140/90: Chronic | ICD-10-CM

## 2024-05-14 DIAGNOSIS — E78.5 HYPERLIPIDEMIA LDL GOAL <130: ICD-10-CM

## 2024-05-14 DIAGNOSIS — E78.5 HYPERLIPIDEMIA LDL GOAL <130: Primary | ICD-10-CM

## 2024-05-14 LAB
ALBUMIN SERPL BCG-MCNC: 4.3 G/DL (ref 3.5–5.2)
ALP SERPL-CCNC: 40 U/L (ref 40–150)
ALT SERPL W P-5'-P-CCNC: 26 U/L (ref 0–70)
ANION GAP SERPL CALCULATED.3IONS-SCNC: 11 MMOL/L (ref 7–15)
AST SERPL W P-5'-P-CCNC: 25 U/L (ref 0–45)
BILIRUB SERPL-MCNC: 0.4 MG/DL
BUN SERPL-MCNC: 20.4 MG/DL (ref 8–23)
CALCIUM SERPL-MCNC: 9.5 MG/DL (ref 8.8–10.2)
CHLORIDE SERPL-SCNC: 103 MMOL/L (ref 98–107)
CHOLEST SERPL-MCNC: 221 MG/DL
CREAT SERPL-MCNC: 1.13 MG/DL (ref 0.67–1.17)
DEPRECATED HCO3 PLAS-SCNC: 24 MMOL/L (ref 22–29)
EGFRCR SERPLBLD CKD-EPI 2021: 74 ML/MIN/1.73M2
FASTING STATUS PATIENT QL REPORTED: YES
FASTING STATUS PATIENT QL REPORTED: YES
GLUCOSE SERPL-MCNC: 123 MG/DL (ref 70–99)
HBA1C MFR BLD: 6 % (ref 0–5.6)
HDLC SERPL-MCNC: 102 MG/DL
LDLC SERPL CALC-MCNC: 103 MG/DL
NONHDLC SERPL-MCNC: 119 MG/DL
POTASSIUM SERPL-SCNC: 4.7 MMOL/L (ref 3.4–5.3)
PROT SERPL-MCNC: 6.5 G/DL (ref 6.4–8.3)
PSA SERPL DL<=0.01 NG/ML-MCNC: 0.29 NG/ML (ref 0–4.5)
SODIUM SERPL-SCNC: 138 MMOL/L (ref 135–145)
TRIGL SERPL-MCNC: 80 MG/DL

## 2024-05-14 PROCEDURE — 36415 COLL VENOUS BLD VENIPUNCTURE: CPT

## 2024-05-14 PROCEDURE — 83036 HEMOGLOBIN GLYCOSYLATED A1C: CPT

## 2024-05-14 PROCEDURE — G0103 PSA SCREENING: HCPCS

## 2024-05-14 PROCEDURE — 80061 LIPID PANEL: CPT

## 2024-05-14 PROCEDURE — 80053 COMPREHEN METABOLIC PANEL: CPT

## 2024-05-14 RX ORDER — ATORVASTATIN CALCIUM 20 MG/1
20 TABLET, FILM COATED ORAL DAILY
Qty: 90 TABLET | Refills: 1 | Status: SHIPPED | OUTPATIENT
Start: 2024-05-14 | End: 2024-08-13

## 2024-05-17 ENCOUNTER — TELEPHONE (OUTPATIENT)
Dept: FAMILY MEDICINE | Facility: CLINIC | Age: 64
End: 2024-05-17
Payer: COMMERCIAL

## 2024-05-17 ENCOUNTER — HOSPITAL ENCOUNTER (OUTPATIENT)
Facility: AMBULATORY SURGERY CENTER | Age: 64
Discharge: HOME | End: 2024-05-17
Attending: FAMILY MEDICINE | Admitting: FAMILY MEDICINE
Payer: COMMERCIAL

## 2024-05-17 VITALS
HEART RATE: 47 BPM | OXYGEN SATURATION: 98 % | DIASTOLIC BLOOD PRESSURE: 68 MMHG | SYSTOLIC BLOOD PRESSURE: 119 MMHG | TEMPERATURE: 97.8 F | RESPIRATION RATE: 16 BRPM

## 2024-05-17 DIAGNOSIS — Z12.11 SCREEN FOR COLON CANCER: Primary | ICD-10-CM

## 2024-05-17 LAB — COLONOSCOPY: NORMAL

## 2024-05-17 PROCEDURE — 45380 COLONOSCOPY AND BIOPSY: CPT

## 2024-05-17 PROCEDURE — 88305 TISSUE EXAM BY PATHOLOGIST: CPT | Performed by: PATHOLOGY

## 2024-05-17 PROCEDURE — G8907 PT DOC NO EVENTS ON DISCHARG: HCPCS

## 2024-05-17 PROCEDURE — 99152 MOD SED SAME PHYS/QHP 5/>YRS: CPT | Mod: 59 | Performed by: FAMILY MEDICINE

## 2024-05-17 PROCEDURE — G8918 PT W/O PREOP ORDER IV AB PRO: HCPCS

## 2024-05-17 PROCEDURE — 99153 MOD SED SAME PHYS/QHP EA: CPT | Performed by: FAMILY MEDICINE

## 2024-05-17 PROCEDURE — 45380 COLONOSCOPY AND BIOPSY: CPT | Performed by: FAMILY MEDICINE

## 2024-05-17 RX ORDER — LIDOCAINE 40 MG/G
CREAM TOPICAL
Status: DISCONTINUED | OUTPATIENT
Start: 2024-05-17 | End: 2024-05-18 | Stop reason: HOSPADM

## 2024-05-17 RX ORDER — ONDANSETRON 2 MG/ML
4 INJECTION INTRAMUSCULAR; INTRAVENOUS
Status: DISCONTINUED | OUTPATIENT
Start: 2024-05-17 | End: 2024-05-18 | Stop reason: HOSPADM

## 2024-05-17 RX ORDER — FENTANYL CITRATE 50 UG/ML
INJECTION, SOLUTION INTRAMUSCULAR; INTRAVENOUS PRN
Status: DISCONTINUED | OUTPATIENT
Start: 2024-05-17 | End: 2024-05-17 | Stop reason: HOSPADM

## 2024-05-17 NOTE — TELEPHONE ENCOUNTER
Spoke with patient regarding change to lipitor. He is in agreement. Prescription previously sent.

## 2024-05-17 NOTE — H&P
Pre-Endoscopy History and Physical     Henrry Cortes MRN# [id number]   YOB: 1960 Age: 63 year old     Date of Procedure: 5/17/2024  Primary care provider: Annie Wall  Type of Endoscopy: colonoscopy  Reason for Procedure: screening  Type of Anesthesia Anticipated: Moderate Sedation    HPI:    Henrry is a 63 year old male who will be undergoing the above procedure.      A history and physical has been performed. The patient's medications and allergies have been reviewed. The risks and benefits of the procedure and the sedation options and risks were discussed with the patient.  All questions were answered and informed consent was obtained.      He denies a personal or family history of anesthesia complications or bleeding disorders.     Allergies   Allergen Reactions    No Known Drug Allergy         Cannot display prior to admission medications because the patient has not been admitted in this contact.       Patient Active Problem List   Diagnosis    HYPERLIPIDEMIA LDL GOAL <130    Impaired fasting glucose    Snoring    Plantar fasciitis    Hypertension goal BP (blood pressure) < 140/90    DELANEY (obstructive sleep apnea)-Mild (AHI 8)    Family history of bicuspid cardiac valve    Family history of disease of aorta    Umbilical hernia without obstruction and without gangrene    Class 2 severe obesity due to excess calories with serious comorbidity in adult (H)        Past Medical History:   Diagnosis Date    Essential hypertension, benign     Lateral epicondylitis     Mixed hyperlipidemia     Obesity, unspecified 4/5/2006    lost 50#    Sleep apnea     has CPAP        Past Surgical History:   Procedure Laterality Date    BIOPSY  2014    benign cysts on left forearm    COLONOSCOPY  09/20/2013    Procedure: COLONOSCOPY;  Colonscopy/Screen for colon cancer  [facility]  PKT sent [facility]/14/13 [facility];  Surgeon: Annie Wall MD;  Location: [facility] OR    HERNIA REPAIR, UMBILICAL  08/06/2004    with mesh     LAPAROSCOPIC HERNIORRHAPHY VENTRAL  2022    robotic    LASIK  ,2004    had revisions x 2    ORTHOPEDIC SURGERY  2016    Right Hip replacement    REMOVAL OF SPERM DUCT(S)      SOFT TISSUE SURGERY  Oct 2019, 2020    Carpul Tunnel release, both hands       Social History     Tobacco Use    Smoking status: Former     Current packs/day: 0.00     Average packs/day: 0.5 packs/day for 20.0 years (10.0 ttl pk-yrs)     Types: Cigarettes     Start date: 10/20/1990     Quit date: 10/20/2010     Years since quittin.5    Smokeless tobacco: Never   Substance Use Topics    Alcohol use: Not Currently     Alcohol/week: 0.8 standard drinks of alcohol     Comment: occasionally 1-2 drinks per week       Family History   Problem Relation Age of Onset    Heart Disease Father         composite valve/aotic graft and triple bypass age 69    Hypertension Father     Cancer Father     Cerebrovascular Disease Father         Post bypass surgery    Prostate Cancer Father     Cardiovascular Father     Lipids Father     Hypertension Mother     Alzheimer Disease Mother         dementia    Hyperlipidemia Mother     Unknown/Adopted Mother         Vascular Dementia    Diabetes Paternal Grandmother     Genitourinary Problems Maternal Grandmother         kidney    Cancer Sister 47        breast    Breast Cancer Sister     Breast Cancer Sister     Asthma Son         mild    Breast Cancer Sister     C.A.D. No family hx of     Cancer - colorectal No family hx of     Alcohol/Drug No family hx of     Allergies No family hx of     Anesthesia Reaction No family hx of     Arthritis No family hx of     Blood Disease No family hx of     Circulatory No family hx of     Congenital Anomalies No family hx of     Connective Tissue Disorder No family hx of     Depression No family hx of     Eye Disorder No family hx of     Genetic Disorder No family hx of     Gastrointestinal Disease No family hx of     Gynecology No family hx of      "Musculoskeletal Disorder No family hx of     Neurologic Disorder No family hx of     Obesity No family hx of     Osteoporosis No family hx of     Psychotic Disorder No family hx of     Respiratory No family hx of     Thyroid Disease No family hx of     Hearing Loss No family hx of        REVIEW OF SYSTEMS:     5 point ROS negative except as noted above in HPI, including Gen., Resp., CV, GI &  system review.      PHYSICAL EXAM:   /68   Temp 97.1  F (36.2  C) (Temporal)   Resp 16   SpO2 97%  Estimated body mass index is 36.05 kg/m  as calculated from the following:    Height as of 5/7/24: 1.795 m (5' 10.67\").    Weight as of 5/7/24: 116.2 kg (256 lb 1.6 oz).   GENERAL APPEARANCE: healthy, alert, and no distress  MENTAL STATUS: alert and oriented x 3  AIRWAY EXAM: Mallampatti Class II (visualization of the soft palate, fauces, and uvula)  RESP: lungs clear to auscultation - no rales, rhonchi or wheezes  CV: regular rates and rhythm and normal S1 S2, no S3 or S4      DIAGNOSTICS:    Not indicated      IMPRESSION   ASA Class 2 - Mild systemic disease        PLAN:       Plan for colonoscopy. We discussed the risks, benefits and alternatives and the patient wished to proceed.    The above has been forwarded to the consulting provider.      Signed Electronically by: Annie Wall MD  May 17, 2024    "

## 2024-05-21 LAB
PATH REPORT.COMMENTS IMP SPEC: NORMAL
PATH REPORT.COMMENTS IMP SPEC: NORMAL
PATH REPORT.FINAL DX SPEC: NORMAL
PATH REPORT.GROSS SPEC: NORMAL
PATH REPORT.MICROSCOPIC SPEC OTHER STN: NORMAL
PATH REPORT.RELEVANT HX SPEC: NORMAL
PHOTO IMAGE: NORMAL

## 2024-05-22 ENCOUNTER — MYC MEDICAL ADVICE (OUTPATIENT)
Dept: FAMILY MEDICINE | Facility: CLINIC | Age: 64
End: 2024-05-22
Payer: COMMERCIAL

## 2024-05-22 DIAGNOSIS — G47.33 OSA (OBSTRUCTIVE SLEEP APNEA): Primary | ICD-10-CM

## 2024-05-22 NOTE — TELEPHONE ENCOUNTER
Patient wondering if needing to repeat sleep study or update equipment; would you like a visit of some kind to discuss further? Referral is pending if wanting to just repeat.    Lyubov Randhawa CMA ([facility])

## 2024-05-24 ENCOUNTER — TRANSFERRED RECORDS (OUTPATIENT)
Dept: HEALTH INFORMATION MANAGEMENT | Facility: CLINIC | Age: 64
End: 2024-05-24
Payer: COMMERCIAL

## 2024-05-29 ENCOUNTER — DOCUMENTATION ONLY (OUTPATIENT)
Dept: SLEEP MEDICINE | Facility: CLINIC | Age: 64
End: 2024-05-29
Payer: COMMERCIAL

## 2024-05-29 DIAGNOSIS — G47.33 OSA (OBSTRUCTIVE SLEEP APNEA): Primary | ICD-10-CM

## 2024-05-29 NOTE — PROGRESS NOTES
Patient was offered choice of vendor and chose [facility].  Patient Henrry Cortes was set up at [place] on May 29, 2024. Patient received a Resmed Airsense 10 Pressures were set at  5-18 cm H2O.   Patient s ramp is 5 cm H2O for Off and FLEX/EPR is EPR, 2.  Patient received a Resmed Mask name: AIRFIT P10  Pillow mask size Medium, heated tubing and heated humidifier.  Patient has the following compliance requirements: none.  Nazia Rios

## 2024-08-13 ENCOUNTER — MYC MEDICAL ADVICE (OUTPATIENT)
Dept: FAMILY MEDICINE | Facility: CLINIC | Age: 64
End: 2024-08-13
Payer: COMMERCIAL

## 2024-08-13 DIAGNOSIS — E78.5 HYPERLIPIDEMIA LDL GOAL <130: ICD-10-CM

## 2024-08-13 DIAGNOSIS — I10 HYPERTENSION GOAL BP (BLOOD PRESSURE) < 140/90: Chronic | ICD-10-CM

## 2024-08-13 RX ORDER — LISINOPRIL 40 MG/1
40 TABLET ORAL DAILY
Qty: 90 TABLET | Refills: 2 | Status: SHIPPED | OUTPATIENT
Start: 2024-08-13

## 2024-08-13 RX ORDER — ATORVASTATIN CALCIUM 20 MG/1
20 TABLET, FILM COATED ORAL DAILY
Qty: 90 TABLET | Refills: 0 | Status: SHIPPED | OUTPATIENT
Start: 2024-08-13

## 2024-08-13 NOTE — TELEPHONE ENCOUNTER
Pharmacy change done    Bharati Card RN  [facility] - Registered Nurse  Clinic Triage [place]   August 13, 2024

## 2024-11-19 ENCOUNTER — MYC REFILL (OUTPATIENT)
Dept: FAMILY MEDICINE | Facility: CLINIC | Age: 64
End: 2024-11-19
Payer: COMMERCIAL

## 2024-11-19 DIAGNOSIS — I10 HYPERTENSION GOAL BP (BLOOD PRESSURE) < 140/90: Chronic | ICD-10-CM

## 2024-11-19 DIAGNOSIS — E78.5 HYPERLIPIDEMIA LDL GOAL <130: ICD-10-CM

## 2024-11-19 RX ORDER — ATORVASTATIN CALCIUM 20 MG/1
20 TABLET, FILM COATED ORAL DAILY
Qty: 90 TABLET | Refills: 0 | Status: SHIPPED | OUTPATIENT
Start: 2024-11-19

## 2024-11-19 RX ORDER — LISINOPRIL 40 MG/1
40 TABLET ORAL DAILY
Qty: 90 TABLET | Refills: 2 | OUTPATIENT
Start: 2024-11-19

## 2025-02-07 ENCOUNTER — MYC MEDICAL ADVICE (OUTPATIENT)
Dept: FAMILY MEDICINE | Facility: CLINIC | Age: 65
End: 2025-02-07
Payer: COMMERCIAL

## 2025-02-07 DIAGNOSIS — Z79.899 ENCOUNTER FOR LONG-TERM (CURRENT) USE OF MEDICATIONS: ICD-10-CM

## 2025-02-07 DIAGNOSIS — I10 HYPERTENSION GOAL BP (BLOOD PRESSURE) < 140/90: Primary | Chronic | ICD-10-CM

## 2025-02-07 DIAGNOSIS — E78.5 HYPERLIPIDEMIA LDL GOAL <130: ICD-10-CM

## 2025-02-17 ENCOUNTER — LAB (OUTPATIENT)
Dept: LAB | Facility: CLINIC | Age: 65
End: 2025-02-17
Payer: COMMERCIAL

## 2025-02-17 DIAGNOSIS — E78.5 HYPERLIPIDEMIA LDL GOAL <130: ICD-10-CM

## 2025-02-17 DIAGNOSIS — Z79.899 ENCOUNTER FOR LONG-TERM (CURRENT) USE OF MEDICATIONS: ICD-10-CM

## 2025-02-17 DIAGNOSIS — I10 HYPERTENSION GOAL BP (BLOOD PRESSURE) < 140/90: Chronic | ICD-10-CM

## 2025-02-17 LAB
ALBUMIN SERPL BCG-MCNC: 4.4 G/DL (ref 3.5–5.2)
ALP SERPL-CCNC: 72 U/L (ref 40–150)
ALT SERPL W P-5'-P-CCNC: 46 U/L (ref 0–70)
ANION GAP SERPL CALCULATED.3IONS-SCNC: 13 MMOL/L (ref 7–15)
AST SERPL W P-5'-P-CCNC: 36 U/L (ref 0–45)
BILIRUB SERPL-MCNC: 0.6 MG/DL
BUN SERPL-MCNC: 24.6 MG/DL (ref 8–23)
CALCIUM SERPL-MCNC: 9.9 MG/DL (ref 8.8–10.4)
CHLORIDE SERPL-SCNC: 100 MMOL/L (ref 98–107)
CHOLEST SERPL-MCNC: 216 MG/DL
CREAT SERPL-MCNC: 1.11 MG/DL (ref 0.67–1.17)
EGFRCR SERPLBLD CKD-EPI 2021: 75 ML/MIN/1.73M2
FASTING STATUS PATIENT QL REPORTED: NO
FASTING STATUS PATIENT QL REPORTED: NO
GLUCOSE SERPL-MCNC: 153 MG/DL (ref 70–99)
HCO3 SERPL-SCNC: 24 MMOL/L (ref 22–29)
HDLC SERPL-MCNC: 84 MG/DL
LDLC SERPL CALC-MCNC: 90 MG/DL
NONHDLC SERPL-MCNC: 132 MG/DL
POTASSIUM SERPL-SCNC: 4.8 MMOL/L (ref 3.4–5.3)
PROT SERPL-MCNC: 7.1 G/DL (ref 6.4–8.3)
SODIUM SERPL-SCNC: 137 MMOL/L (ref 135–145)
TRIGL SERPL-MCNC: 208 MG/DL

## 2025-02-17 PROCEDURE — 36415 COLL VENOUS BLD VENIPUNCTURE: CPT

## 2025-02-17 PROCEDURE — 80061 LIPID PANEL: CPT

## 2025-02-17 PROCEDURE — 80053 COMPREHEN METABOLIC PANEL: CPT

## 2025-02-18 ENCOUNTER — MYC MEDICAL ADVICE (OUTPATIENT)
Dept: FAMILY MEDICINE | Facility: CLINIC | Age: 65
End: 2025-02-18
Payer: COMMERCIAL

## 2025-02-18 ENCOUNTER — TELEPHONE (OUTPATIENT)
Dept: FAMILY MEDICINE | Facility: CLINIC | Age: 65
End: 2025-02-18
Payer: COMMERCIAL

## 2025-02-18 DIAGNOSIS — Z13.1 SCREENING FOR DIABETES MELLITUS: Primary | ICD-10-CM

## 2025-02-20 ENCOUNTER — LAB (OUTPATIENT)
Dept: LAB | Facility: CLINIC | Age: 65
End: 2025-02-20
Payer: COMMERCIAL

## 2025-02-20 DIAGNOSIS — Z13.1 SCREENING FOR DIABETES MELLITUS: ICD-10-CM

## 2025-02-20 LAB
EST. AVERAGE GLUCOSE BLD GHB EST-MCNC: 140 MG/DL
HBA1C MFR BLD: 6.5 % (ref 0–5.6)

## 2025-02-25 NOTE — TELEPHONE ENCOUNTER
Scheduled patient, sent MyChart.    Next 5 appointments (look out 90 days)      Apr 01, 2025 9:00 AM  (Arrive by 8:40 AM)  Provider Visit with Annie Wall MD  [facility] [place] ([facility]) [address], [address]  [facility] [address] [phone]

## 2025-02-25 NOTE — TELEPHONE ENCOUNTER
Patient would like to discuss medication (Wegovy or Ozempic) and repeat labs the first week of April. Okay to use Same-day or appt only?

## 2025-03-04 ENCOUNTER — MYC REFILL (OUTPATIENT)
Dept: FAMILY MEDICINE | Facility: CLINIC | Age: 65
End: 2025-03-04
Payer: COMMERCIAL

## 2025-03-04 DIAGNOSIS — E78.5 HYPERLIPIDEMIA LDL GOAL <130: ICD-10-CM

## 2025-03-04 RX ORDER — ATORVASTATIN CALCIUM 20 MG/1
20 TABLET, FILM COATED ORAL DAILY
Qty: 90 TABLET | Refills: 0 | Status: SHIPPED | OUTPATIENT
Start: 2025-03-04

## 2025-04-07 ENCOUNTER — PATIENT OUTREACH (OUTPATIENT)
Dept: CARE COORDINATION | Facility: CLINIC | Age: 65
End: 2025-04-07
Payer: COMMERCIAL

## 2025-04-21 ENCOUNTER — PATIENT OUTREACH (OUTPATIENT)
Dept: CARE COORDINATION | Facility: CLINIC | Age: 65
End: 2025-04-21
Payer: COMMERCIAL

## 2025-05-29 ENCOUNTER — MYC REFILL (OUTPATIENT)
Dept: FAMILY MEDICINE | Facility: CLINIC | Age: 65
End: 2025-05-29
Payer: COMMERCIAL

## 2025-05-29 DIAGNOSIS — E78.5 HYPERLIPIDEMIA LDL GOAL <130: ICD-10-CM

## 2025-05-29 DIAGNOSIS — I10 HYPERTENSION GOAL BP (BLOOD PRESSURE) < 140/90: Chronic | ICD-10-CM

## 2025-05-29 RX ORDER — ATORVASTATIN CALCIUM 20 MG/1
20 TABLET, FILM COATED ORAL DAILY
Qty: 90 TABLET | Refills: 0 | Status: SHIPPED | OUTPATIENT
Start: 2025-05-29

## 2025-05-29 RX ORDER — LISINOPRIL 40 MG/1
40 TABLET ORAL DAILY
Qty: 90 TABLET | Refills: 0 | Status: SHIPPED | OUTPATIENT
Start: 2025-05-29

## 2025-06-14 ENCOUNTER — HEALTH MAINTENANCE LETTER (OUTPATIENT)
Age: 65
End: 2025-06-14

## 2025-06-30 ENCOUNTER — DOCUMENTATION ONLY (OUTPATIENT)
Dept: FAMILY MEDICINE | Facility: CLINIC | Age: 65
End: 2025-06-30
Payer: COMMERCIAL

## 2025-06-30 DIAGNOSIS — G47.33 OSA (OBSTRUCTIVE SLEEP APNEA): Primary | ICD-10-CM

## (undated) DEVICE — SOL WATER IRRIG 1000ML BOTTLE 07139-09

## (undated) RX ORDER — FENTANYL CITRATE 50 UG/ML
INJECTION, SOLUTION INTRAMUSCULAR; INTRAVENOUS
Status: DISPENSED
Start: 2024-05-17